# Patient Record
Sex: FEMALE | Race: WHITE | Employment: OTHER | URBAN - METROPOLITAN AREA
[De-identification: names, ages, dates, MRNs, and addresses within clinical notes are randomized per-mention and may not be internally consistent; named-entity substitution may affect disease eponyms.]

---

## 2017-07-06 ENCOUNTER — TELEPHONE (OUTPATIENT)
Dept: NUTRITION | Age: 67
End: 2017-07-06

## 2017-07-06 NOTE — TELEPHONE ENCOUNTER
Nutrition Counseling: Called pt regarding Dr. Mathew Tracey referral for nutrition counseling. Pt voiced what she would like to learn with nutrition counseling and is interested in appt. RD explained that with current referral dx, she will not be covered with Medicare (Medicare only covers is CKD or DM is listed as one of the referral diagnoses). Noted that pt can talk to referring MD to see if DM would be appropriate to add to referral dx. Pt states she would like to call Dr. Marcin Mazariegos office to see about re-sending referral with DM dx added in EMR, otherwise she won't be able to afford self-pay rate to come for appt. Gave contact info and encouraged pt to call with any other questions.      Guillermo Beltran, [address],   Outpatient Dietitian  Office: [phone]  Cell: [phone]

## 2017-08-17 ENCOUNTER — TELEPHONE (OUTPATIENT)
Dept: NUTRITION | Age: 67
End: 2017-08-17

## 2017-08-17 NOTE — TELEPHONE ENCOUNTER
Nutrition Counseling:  Pt returned call and reported she is still interested in nutrition counseling. She said she's been sick and was not able to contact MD yet regarding addition of DM diagnosis to referral. She plans to call the MD's office soon regarding referral. RD encouraged pt to call with any other questions.        Rishi Henderson, [address] [place] [address],   W: [phone]  C: [phone]

## 2017-08-17 NOTE — TELEPHONE ENCOUNTER
Nutrition Counseling: Attempted to call pt to f/u on whether she was able to contact doctor about referral (inclusion of DM dx) and if she is still interested in nutrition counseling. Pt's voice mailbox was full. RD will mail pt brochure with contact information for pt to call back if she decides to schedule.     Fercho Narayan, [address] [place] [address],   Outpatient Dietitian  Office: [phone]  Cell: [phone]

## 2017-09-06 ENCOUNTER — DOCUMENTATION ONLY (OUTPATIENT)
Dept: NUTRITION | Age: 67
End: 2017-09-06

## 2017-09-06 NOTE — PROGRESS NOTES
Nutrition Counseling: Unable to leave f/u voicemail on call attempts d/t pt's mailbox being full. Sent letter to f/u on interest in setting appt, whether she was able to contact referring MD regarding referral diagnoses, and to offer to answer any questions or schedule appt when pt is available.      Camilla Davison, [address] [place] [address],   Outpatient Dietitian  Office: [phone]  Cell: [phone]

## 2017-09-25 ENCOUNTER — DOCUMENTATION ONLY (OUTPATIENT)
Dept: NUTRITION | Age: 67
End: 2017-09-25

## 2017-09-25 NOTE — PROGRESS NOTES
Nutrition Counseling:  Pt has not contacted RD further regarding scheduling appt or with questions regarding referral. Will close referral for this office and notify referring physician.     Fercho Narayan, [address], [place]  Outpatient Dietitian  Office: [phone]  Cell: [phone]

## 2018-01-10 PROBLEM — F33.9 RECURRENT DEPRESSION (HCC): Status: ACTIVE | Noted: 2018-01-10

## 2018-01-10 PROBLEM — M79.605 PAIN OF LEFT LOWER EXTREMITY: Status: ACTIVE | Noted: 2018-01-10

## 2018-06-06 ENCOUNTER — HOSPITAL ENCOUNTER (OUTPATIENT)
Dept: GENERAL RADIOLOGY | Age: 68
Discharge: HOME | End: 2018-06-06
Payer: MEDICARE

## 2018-06-06 DIAGNOSIS — J84.9 ILD (INTERSTITIAL LUNG DISEASE) (HCC): ICD-10-CM

## 2018-06-06 DIAGNOSIS — J45.909 ASTHMA, UNSPECIFIED ASTHMA SEVERITY, UNSPECIFIED WHETHER COMPLICATED, UNSPECIFIED WHETHER PERSISTENT: ICD-10-CM

## 2018-06-06 PROCEDURE — 71046 X-RAY EXAM CHEST 2 VIEWS: CPT

## 2018-10-01 ENCOUNTER — APPOINTMENT (OUTPATIENT)
Dept: CT IMAGING | Age: 68
End: 2018-10-01
Attending: EMERGENCY MEDICINE
Payer: MEDICARE

## 2018-10-01 ENCOUNTER — HOSPITAL ENCOUNTER (EMERGENCY)
Age: 68
Discharge: HOME | End: 2018-10-01
Attending: EMERGENCY MEDICINE
Payer: MEDICARE

## 2018-10-01 VITALS
RESPIRATION RATE: 20 BRPM | OXYGEN SATURATION: 98 % | DIASTOLIC BLOOD PRESSURE: 84 MMHG | HEIGHT: 62 IN | BODY MASS INDEX: 36.99 KG/M2 | TEMPERATURE: 98 F | HEART RATE: 84 BPM | SYSTOLIC BLOOD PRESSURE: 175 MMHG | WEIGHT: 201 LBS

## 2018-10-01 DIAGNOSIS — R10.9 ACUTE FLANK PAIN: Primary | ICD-10-CM

## 2018-10-01 LAB
ALBUMIN SERPL-MCNC: 4.1 G/DL (ref 3.2–4.6)
ALBUMIN/GLOB SERPL: 1.2 {RATIO} (ref 1.2–3.5)
ALP SERPL-CCNC: 63 U/L (ref 50–136)
ALT SERPL-CCNC: 18 U/L (ref 12–65)
ANION GAP SERPL CALC-SCNC: 5 MMOL/L (ref 7–16)
AST SERPL-CCNC: 11 U/L (ref 15–37)
BASOPHILS # BLD: 0 K/UL (ref 0–0.2)
BASOPHILS NFR BLD: 1 % (ref 0–2)
BILIRUB SERPL-MCNC: 0.3 MG/DL (ref 0.2–1.1)
BUN SERPL-MCNC: 21 MG/DL (ref 8–23)
CALCIUM SERPL-MCNC: 9.6 MG/DL (ref 8.3–10.4)
CHLORIDE SERPL-SCNC: 106 MMOL/L (ref 98–107)
CO2 SERPL-SCNC: 30 MMOL/L (ref 21–32)
CREAT SERPL-MCNC: 0.94 MG/DL (ref 0.6–1)
DIFFERENTIAL METHOD BLD: ABNORMAL
EOSINOPHIL # BLD: 0.2 K/UL (ref 0–0.8)
EOSINOPHIL NFR BLD: 3 % (ref 0.5–7.8)
ERYTHROCYTE [DISTWIDTH] IN BLOOD BY AUTOMATED COUNT: 14.8 %
GLOBULIN SER CALC-MCNC: 3.3 G/DL (ref 2.3–3.5)
GLUCOSE SERPL-MCNC: 102 MG/DL (ref 65–100)
HCT VFR BLD AUTO: 40.1 % (ref 35.8–46.3)
HGB BLD-MCNC: 12.5 G/DL (ref 11.7–15.4)
IMM GRANULOCYTES # BLD: 0 K/UL (ref 0–0.5)
IMM GRANULOCYTES NFR BLD AUTO: 0 % (ref 0–5)
LIPASE SERPL-CCNC: 107 U/L (ref 73–393)
LYMPHOCYTES # BLD: 1.6 K/UL (ref 0.5–4.6)
LYMPHOCYTES NFR BLD: 24 % (ref 13–44)
MCH RBC QN AUTO: 29.1 PG (ref 26.1–32.9)
MCHC RBC AUTO-ENTMCNC: 31.2 G/DL (ref 31.4–35)
MCV RBC AUTO: 93.5 FL (ref 79.6–97.8)
MONOCYTES # BLD: 0.5 K/UL (ref 0.1–1.3)
MONOCYTES NFR BLD: 8 % (ref 4–12)
NEUTS SEG # BLD: 4.3 K/UL (ref 1.7–8.2)
NEUTS SEG NFR BLD: 64 % (ref 43–78)
NRBC # BLD: 0 K/UL (ref 0–0.2)
PLATELET # BLD AUTO: 320 K/UL (ref 150–450)
PMV BLD AUTO: 9 FL (ref 9.4–12.3)
POTASSIUM SERPL-SCNC: 4.5 MMOL/L (ref 3.5–5.1)
PROT SERPL-MCNC: 7.4 G/DL (ref 6.3–8.2)
RBC # BLD AUTO: 4.29 M/UL (ref 4.05–5.2)
SODIUM SERPL-SCNC: 141 MMOL/L (ref 136–145)
WBC # BLD AUTO: 6.8 K/UL (ref 4.3–11.1)

## 2018-10-01 PROCEDURE — 74177 CT ABD & PELVIS W/CONTRAST: CPT

## 2018-10-01 PROCEDURE — 96375 TX/PRO/DX INJ NEW DRUG ADDON: CPT | Performed by: EMERGENCY MEDICINE

## 2018-10-01 PROCEDURE — 96361 HYDRATE IV INFUSION ADD-ON: CPT | Performed by: EMERGENCY MEDICINE

## 2018-10-01 PROCEDURE — 96374 THER/PROPH/DIAG INJ IV PUSH: CPT | Performed by: EMERGENCY MEDICINE

## 2018-10-01 PROCEDURE — 99284 EMERGENCY DEPT VISIT MOD MDM: CPT | Performed by: EMERGENCY MEDICINE

## 2018-10-01 PROCEDURE — 74011636320 HC RX REV CODE- 636/320: Performed by: EMERGENCY MEDICINE

## 2018-10-01 PROCEDURE — 80053 COMPREHEN METABOLIC PANEL: CPT

## 2018-10-01 PROCEDURE — 74011000258 HC RX REV CODE- 258: Performed by: EMERGENCY MEDICINE

## 2018-10-01 PROCEDURE — 74011250636 HC RX REV CODE- 250/636: Performed by: EMERGENCY MEDICINE

## 2018-10-01 PROCEDURE — 85025 COMPLETE CBC W/AUTO DIFF WBC: CPT

## 2018-10-01 PROCEDURE — 83690 ASSAY OF LIPASE: CPT

## 2018-10-01 PROCEDURE — 81003 URINALYSIS AUTO W/O SCOPE: CPT | Performed by: EMERGENCY MEDICINE

## 2018-10-01 RX ORDER — ONDANSETRON 4 MG/1
4 TABLET, FILM COATED ORAL
Qty: 15 TAB | Refills: 0 | Status: SHIPPED | OUTPATIENT
Start: 2018-10-01 | End: 2018-12-17 | Stop reason: CLARIF

## 2018-10-01 RX ORDER — SODIUM CHLORIDE 0.9 % (FLUSH) 0.9 %
10 SYRINGE (ML) INJECTION
Status: COMPLETED | OUTPATIENT
Start: 2018-10-01 | End: 2018-10-01

## 2018-10-01 RX ORDER — TRAMADOL HYDROCHLORIDE 50 MG/1
100 TABLET ORAL
Qty: 19 TAB | Refills: 0 | Status: SHIPPED | OUTPATIENT
Start: 2018-10-01 | End: 2022-02-22 | Stop reason: ALTCHOICE

## 2018-10-01 RX ORDER — METHOCARBAMOL 750 MG/1
1500 TABLET, FILM COATED ORAL 4 TIMES DAILY
Qty: 40 TAB | Refills: 0 | Status: SHIPPED | OUTPATIENT
Start: 2018-10-01 | End: 2022-02-22 | Stop reason: ALTCHOICE

## 2018-10-01 RX ORDER — HYDROMORPHONE HYDROCHLORIDE 1 MG/ML
1 INJECTION, SOLUTION INTRAMUSCULAR; INTRAVENOUS; SUBCUTANEOUS
Status: COMPLETED | OUTPATIENT
Start: 2018-10-01 | End: 2018-10-01

## 2018-10-01 RX ORDER — ONDANSETRON 2 MG/ML
4 INJECTION INTRAMUSCULAR; INTRAVENOUS
Status: COMPLETED | OUTPATIENT
Start: 2018-10-01 | End: 2018-10-01

## 2018-10-01 RX ADMIN — SODIUM CHLORIDE 100 ML: 900 INJECTION, SOLUTION INTRAVENOUS at 14:30

## 2018-10-01 RX ADMIN — ONDANSETRON 4 MG: 2 INJECTION INTRAMUSCULAR; INTRAVENOUS at 14:01

## 2018-10-01 RX ADMIN — HYDROMORPHONE HYDROCHLORIDE 1 MG: 1 INJECTION, SOLUTION INTRAMUSCULAR; INTRAVENOUS; SUBCUTANEOUS at 14:01

## 2018-10-01 RX ADMIN — SODIUM CHLORIDE 1000 ML: 900 INJECTION, SOLUTION INTRAVENOUS at 13:50

## 2018-10-01 RX ADMIN — Medication 10 ML: at 14:30

## 2018-10-01 RX ADMIN — IOPAMIDOL 100 ML: 755 INJECTION, SOLUTION INTRAVENOUS at 14:29

## 2018-10-01 NOTE — ED PROVIDER NOTES
Patient is a 70 y.o. female presenting with flank pain. The history is provided by the patient and a relative. Flank Pain This is a recurrent problem. The current episode started more than 1 week ago. The problem has been gradually worsening. The problem occurs daily. The pain is associated with no known injury. The pain is present in the right side. The quality of the pain is described as aching and stabbing. The pain is moderate. The symptoms are aggravated by bending and twisting. Associated symptoms include abdominal pain. Pertinent negatives include no chest pain, no fever, no weight loss, no headaches, no abdominal swelling, no dysuria, no leg pain, no paresis, no tingling and no weakness. She has tried nothing for the symptoms. Risk factors include obesity. The patient's surgical history non-contributory Past Medical History:  
Diagnosis Date  History of pneumonia 04/2013 Pneumonia/asthma exacerbation, was on vent and transferred to [facility] for weaning; D/C home on oxygen. Past Surgical History:  
Procedure Laterality Date  HX HEART CATHETERIZATION    
 HX HEENT Sinus sx Family History:  
Problem Relation Age of Onset  Heart Disease Mother  Liver Disease Father   
  alcohol related  Diabetes Sister Social History Social History  Marital status:  Spouse name: N/A  
 Number of children: N/A  
 Years of education: N/A Occupational History  previously worked as  for school system Social History Main Topics  Smoking status: Never Smoker  Smokeless tobacco: Never Used  Alcohol use No  
 Drug use: No  
 Sexual activity: Not on file Other Topics Concern  Not on file Social History Narrative , lives with . She works as a  for the [employer]. She is a lifelong nonsmoker/drinker. ALLERGIES: Latex, natural rubber; Cephalosporins; Diprivan [propofol]; Lidocaine; Lyrica [pregabalin]; Morphine; Pcn [penicillins]; and Rocephin [ceftriaxone] Review of Systems Constitutional: Negative for chills, fever and weight loss. HENT: Negative for congestion, ear pain and rhinorrhea. Eyes: Negative for photophobia and discharge. Respiratory: Negative for cough and shortness of breath. Cardiovascular: Negative for chest pain and palpitations. Gastrointestinal: Positive for abdominal pain. Negative for constipation, diarrhea and vomiting. Endocrine: Negative for cold intolerance and heat intolerance. Genitourinary: Positive for flank pain. Negative for dysuria. Musculoskeletal: Negative for arthralgias, myalgias and neck pain. Skin: Negative for rash and wound. Allergic/Immunologic: Negative for environmental allergies and food allergies. Neurological: Negative for tingling, syncope, weakness and headaches. Hematological: Negative for adenopathy. Does not bruise/bleed easily. Psychiatric/Behavioral: Negative for dysphoric mood. The patient is not nervous/anxious. All other systems reviewed and are negative. Vitals:  
 10/01/18 1242 BP: 182/77 Resp: 24 Temp: 98 °F (36.7 °C) SpO2: 95% Weight: 91.2 kg (201 lb) Height: 5' 2\" (1.575 m) Physical Exam  
Constitutional: She is oriented to person, place, and time. She appears well-developed and well-nourished. She appears distressed. HENT:  
Head: Normocephalic and atraumatic. Mouth/Throat: Oropharynx is clear and moist.  
Eyes: Conjunctivae and EOM are normal. Pupils are equal, round, and reactive to light. Right eye exhibits no discharge. Left eye exhibits no discharge. No scleral icterus. Neck: Normal range of motion. Neck supple. No thyromegaly present. Cardiovascular: Normal rate, regular rhythm, normal heart sounds and intact distal pulses. Exam reveals no gallop and no friction rub. No murmur heard. Pulmonary/Chest: Effort normal and breath sounds normal. No respiratory distress. She has no wheezes. She exhibits no tenderness. Abdominal: Soft. Bowel sounds are normal. She exhibits no distension. There is no hepatosplenomegaly. There is generalized tenderness. There is no rebound and no guarding. No hernia. exam reveals scattered areas of tenderness across the entire 4 quadrants of the patient's abdomen Unfortunately, there is a lack of consistency with regards to location and severity of the symptoms with my palpation Musculoskeletal: Normal range of motion. She exhibits no edema or tenderness. Neurological: She is alert and oriented to person, place, and time. No cranial nerve deficit. She exhibits normal muscle tone. Skin: Skin is warm. No rash noted. She is not diaphoretic. No erythema. Psychiatric: Her speech is normal and behavior is normal. Judgment and thought content normal. Her mood appears anxious. Cognition and memory are normal.  
  
 
MDM Number of Diagnoses or Management Options Acute flank pain: new and requires workup Diagnosis management comments: Differential diagnosis Colitis, kidney stone, UTI, bowel obstruction Patient's controlled substance prescription records reviewed @ the [facility]  Aware website. Information will be utilized for accurate and appropriate patient care. Amount and/or Complexity of Data Reviewed Clinical lab tests: ordered and reviewed Tests in the radiology section of CPT®: ordered Tests in the medicine section of CPT®: ordered and reviewed Review and summarize past medical records: yes Risk of Complications, Morbidity, and/or Mortality Presenting problems: moderate Diagnostic procedures: moderate Management options: moderate General comments: Elements of this note have been dictated via voice recognition software. Text and phrases may be limited by the accuracy of the software. The chart has been reviewed, but errors may still be present. Patient Progress Patient progress: improved ED Course Procedures

## 2018-10-01 NOTE — ED NOTES
I have reviewed discharge instructions with the patient and family. The patient and family  verbalized understanding. Patient left ED via Discharge Method: ambulatory to Home with (insert name of family/friend, self). Opportunity for questions and clarification provided. Patient given 3 scripts. To continue your aftercare when you leave the hospital, you may receive an automated call from our care team to check in on how you are doing. This is a free service and part of our promise to provide the best care and service to meet your aftercare needs.  If you have questions, or wish to unsubscribe from this service please call [phone]. Thank you for Choosing our [facility].   
3

## 2018-10-01 NOTE — DISCHARGE INSTRUCTIONS

## 2018-11-19 ENCOUNTER — HOSPITAL ENCOUNTER (OUTPATIENT)
Dept: SURGERY | Age: 68
Discharge: HOME | End: 2018-11-19

## 2018-12-04 ENCOUNTER — APPOINTMENT (OUTPATIENT)
Dept: GENERAL RADIOLOGY | Age: 68
End: 2018-12-04
Attending: ANESTHESIOLOGY
Payer: MEDICARE

## 2018-12-04 ENCOUNTER — HOSPITAL ENCOUNTER (OUTPATIENT)
Age: 68
Setting detail: OUTPATIENT SURGERY
Discharge: HOME | End: 2018-12-04
Attending: ANESTHESIOLOGY | Admitting: ANESTHESIOLOGY
Payer: MEDICARE

## 2018-12-04 VITALS
TEMPERATURE: 97.9 F | WEIGHT: 206 LBS | SYSTOLIC BLOOD PRESSURE: 156 MMHG | BODY MASS INDEX: 37.68 KG/M2 | RESPIRATION RATE: 16 BRPM | HEART RATE: 61 BPM | OXYGEN SATURATION: 95 % | DIASTOLIC BLOOD PRESSURE: 68 MMHG

## 2018-12-04 PROCEDURE — 76210000026 HC REC RM PH II 1 TO 1.5 HR: Performed by: ANESTHESIOLOGY

## 2018-12-04 PROCEDURE — 74011250636 HC RX REV CODE- 250/636

## 2018-12-04 PROCEDURE — 74011636320 HC RX REV CODE- 636/320: Performed by: ANESTHESIOLOGY

## 2018-12-04 PROCEDURE — 74011000250 HC RX REV CODE- 250: Performed by: ANESTHESIOLOGY

## 2018-12-04 PROCEDURE — 77030003545 HC NDL EPDRL EPIM -B: Performed by: ANESTHESIOLOGY

## 2018-12-04 PROCEDURE — 77030014125 HC TY EPDRL BBMI -B: Performed by: ANESTHESIOLOGY

## 2018-12-04 PROCEDURE — 76010000093 HC SPECIAL PROCEDURE: Performed by: ANESTHESIOLOGY

## 2018-12-04 PROCEDURE — 99152 MOD SED SAME PHYS/QHP 5/>YRS: CPT

## 2018-12-04 PROCEDURE — A4300 CATH IMPL VASC ACCESS PORTAL: HCPCS | Performed by: ANESTHESIOLOGY

## 2018-12-04 PROCEDURE — 74011250636 HC RX REV CODE- 250/636: Performed by: ANESTHESIOLOGY

## 2018-12-04 RX ORDER — TRIAMCINOLONE ACETONIDE 40 MG/ML
INJECTION, SUSPENSION INTRA-ARTICULAR; INTRAMUSCULAR AS NEEDED
Status: DISCONTINUED | OUTPATIENT
Start: 2018-12-04 | End: 2018-12-04 | Stop reason: HOSPADM

## 2018-12-04 RX ORDER — FENTANYL CITRATE 50 UG/ML
INJECTION, SOLUTION INTRAMUSCULAR; INTRAVENOUS AS NEEDED
Status: DISCONTINUED | OUTPATIENT
Start: 2018-12-04 | End: 2018-12-04 | Stop reason: HOSPADM

## 2018-12-04 RX ORDER — MIDAZOLAM HYDROCHLORIDE 1 MG/ML
INJECTION, SOLUTION INTRAMUSCULAR; INTRAVENOUS AS NEEDED
Status: DISCONTINUED | OUTPATIENT
Start: 2018-12-04 | End: 2018-12-04 | Stop reason: HOSPADM

## 2018-12-04 RX ORDER — METHYLPREDNISOLONE ACETATE 40 MG/ML
INJECTION, SUSPENSION INTRA-ARTICULAR; INTRALESIONAL; INTRAMUSCULAR; SOFT TISSUE AS NEEDED
Status: DISCONTINUED | OUTPATIENT
Start: 2018-12-04 | End: 2018-12-04 | Stop reason: HOSPADM

## 2018-12-04 RX ORDER — SODIUM CHLORIDE 9 MG/ML
INJECTION INTRAMUSCULAR; INTRAVENOUS; SUBCUTANEOUS AS NEEDED
Status: DISCONTINUED | OUTPATIENT
Start: 2018-12-04 | End: 2018-12-04 | Stop reason: HOSPADM

## 2018-12-04 NOTE — OP NOTES
[facility] REPORT    Sean Diaz  MR#: [id number]  : 1950  ACCOUNT #: [de-identified]   DATE OF SERVICE: 2018    PREOPERATIVE DIAGNOSES:  Extensive lumbar spondylosis and neural foraminal stenosis with chronic pain in the lower back and both legs. POSTOPERATIVE DIAGNOSES:  Extensive lumbar spondylosis and neural foraminal stenosis with chronic pain in the lower back and both legs. PROCEDURES PERFORMED:  1. Caudal epidural steroid injection. 2.  Epidurogram.  3.  Fluoroscopy. 4.  IV sedation. SURGEON:  Mendoza Roe MD    ASSISTANT:  None. ANESTHESIA:  Local with IV sedation. INDICATIONS:  The patient is a 59-year-old female with pain in her back and legs. She has seen my partner, Dr. Oumar Pedraza and she presents today for her first epidural steroid injection at [place]. DESCRIPTION OF PROCEDURE:  After informed consent was obtained, a 22-gauge IV was placed in the right arm. The patient was taken to the operating room and initially positioned sitting upright. Lower back was prepped and draped in the usual sterile fashion. A small skin wheal was made approximately over the L4-L5 interspace using normal saline. Next, an 18-gauge Tuohy needle was advanced using the loss of resistance technique. Multiple attempts were made. Bone was contacted. The procedure was repeated one level higher and lower. The bone was contacted on each attempt. The technique was then abandoned and the patient was positioned prone. The lower back was reprepped and redraped in the usual sterile fashion.   A small skin wheal was made over the sacral hiatus using normal saline and an 18-gauge Tuohy needle was advanced through the sacrococcygeal ligament of the caudal canal.  Views were confirmed in AP and lateral fluoroscopy, 3 mL of Omnipaque revealed spread of contrast upwards to the L4 and L5 levels and down both S1 nerve roots.  This was followed by 12 mL of solution consisting of 80 mg of triamcinolone and Depo-Medrol and 10 mL of preservative-free normal saline. She tolerated the entire procedure well. There were no complications. All fluoroscopic views were interpreted by me. ESTIMATED BLOOD LOSS:  None. FLUIDS:  None. COMPLICATIONS:  None. SPECIMENS REMOVED:  none    IMPLANTS:  none    CONDITION:  Stable. PLANS:    1.  I recommended continuing her current medications. I suggested a lumbar DDS decompression brace for her lower back due to her scoliosis. I feel the brace will help improve her back pain by supporting weakened lumbar paraspinous muscles and addressing the scoliotic deformity to her lumbar spine. 2.  I will see her back in 2 weeks for a repeat caudal epidural steroid injection at [facility].         MD MANISH Gleason / [place]  D: 12/04/2018 07:49     T: 12/04/2018 10:45  JOB #: [id number]

## 2018-12-04 NOTE — DISCHARGE INSTRUCTIONS
Pain Management Aftercare    Common Side Effects that may last 8-12 hours:  Drowsiness  Slurred speech  Dizziness  Poor balance  Blurred vision     Hangover effect (headache, upset stomach, etc.)    Aftercare Instructions: You must have a responsible adult drive you home. Do not drive a car or operate equipment for at least 12 hours. Do not take any new medications for at least 24 hours unless your doctor has prescribed them and he is aware that you are taking them. Do not drink any alcoholic beverages until the next day. Advance to your normal diet as tolerated. Expect soreness at the injection site that will improve over the next 24 hours. Avoid strenuous exercise or heavy lifting. Pre-injection activities may be resumed tomorrow (unless instructed otherwise by your doctor). Notify your doctor immediately if any of the following symptoms occur:  Severe pain at the injection site  Bleeding or drainage from injection site  Fever 101 degrees F or greater  New or increased weakness/numbness of extremities that does not resolve  Severe headache that disappears or gets better when you lie down  Breathing difficulty  Skin rash  Vomiting  Seizures  Unusual drowsiness    Doctor's Phone Number: [phone]    Follow-up Care: 12/18/18- For second injection, pre-assessment will call you with details for time of arrival.       Lesle Skiff from Nurse    PATIENT INSTRUCTIONS:    After general anesthesia or intravenous sedation, for 24 hours or while taking prescription Narcotics:  · Limit your activities  · Do not drive and operate hazardous machinery  · Do not make important personal or business decisions  · Do  not drink alcoholic beverages  · If you have not urinated within 8 hours after discharge, please contact your surgeon on call. *  Please give a list of your current medications to your Primary Care Provider.     *  Please update this list whenever your medications are discontinued, doses are changed, or new medications (including over-the-counter products) are added. *  Please carry medication information at all times in case of emergency situations. These are general instructions for a healthy lifestyle:    No smoking/ No tobacco products/ Avoid exposure to second hand smoke    Surgeon General's Warning:  Quitting smoking now greatly reduces serious risk to your health. Obesity, smoking, and sedentary lifestyle greatly increases your risk for illness    A healthy diet, regular physical exercise & weight monitoring are important for maintaining a healthy lifestyle    You may be retaining fluid if you have a history of heart failure or if you experience any of the following symptoms:  Weight gain of 3 pounds or more overnight or 5 pounds in a week, increased swelling in our hands or feet or shortness of breath while lying flat in bed. Please call your doctor as soon as you notice any of these symptoms; do not wait until your next office visit. Recognize signs and symptoms of STROKE:    F-face looks uneven    A-arms unable to move or move unevenly    S-speech slurred or non-existent    T-time-call 911 as soon as signs and symptoms begin-DO NOT go       Back to bed or wait to see if you get better-TIME IS BRAIN. ACTIVITY  · As tolerated and as directed by your doctor. · Bathe or shower as directed by your doctor. DIET  · Clear liquids until no nausea or vomiting; then light diet for the first day. · Advance to regular diet on second day, unless your doctor orders otherwise. · If nausea and vomiting continues, call your doctor. PAIN  · Take pain medication as directed by your doctor. · Call your doctor if pain is NOT relieved by medication. · DO NOT take aspirin of blood thinners unless directed by your doctor.        CALL YOUR DOCTOR IF   · Excessive bleeding that does not stop after holding pressure over the area  · Temperature of 101 degrees F or above  · Excessive redness, swelling or bruising, and/ or green or yellow, smelly discharge from incision    AFTER ANESTHESIA   · For the first 24 hours: DO NOT Drive, Drink alcoholic beverages, or Make important decisions. · Be aware of dizziness following anesthesia and while taking pain medication. DISCHARGE SUMMARY from Nurse    PATIENT INSTRUCTIONS:    After general anesthesia or intravenous sedation, for 24 hours or while taking prescription Narcotics:  · Limit your activities  · Do not drive and operate hazardous machinery  · Do not make important personal or business decisions  · Do  not drink alcoholic beverages  · If you have not urinated within 8 hours after discharge, please contact your surgeon on call. *  Please give a list of your current medications to your Primary Care Provider. *  Please update this list whenever your medications are discontinued, doses are      changed, or new medications (including over-the-counter products) are added. *  Please carry medication information at all times in case of emergency situations. These are general instructions for a healthy lifestyle:    No smoking/ No tobacco products/ Avoid exposure to second hand smoke    Surgeon General's Warning:  Quitting smoking now greatly reduces serious risk to your health. Obesity, smoking, and sedentary lifestyle greatly increases your risk for illness    A healthy diet, regular physical exercise & weight monitoring are important for maintaining a healthy lifestyle    You may be retaining fluid if you have a history of heart failure or if you experience any of the following symptoms:  Weight gain of 3 pounds or more overnight or 5 pounds in a week, increased swelling in our hands or feet or shortness of breath while lying flat in bed. Please call your doctor as soon as you notice any of these symptoms; do not wait until your next office visit.     Recognize signs and symptoms of STROKE:    F-face looks uneven    A-arms unable to move or move unevenly    S-speech slurred or non-existent    T-time-call 911 as soon as signs and symptoms begin-DO NOT go       Back to bed or wait to see if you get better-TIME IS BRAIN.

## 2018-12-04 NOTE — PERIOP NOTES
OR scheduling called. Pt placed on schedule for 12/18/18 for #2 Lumbar DARWIN per request Dr Tasha Alba. DC instructions reflect this schedule as well.

## 2018-12-04 NOTE — BRIEF OP NOTE
BRIEF OPERATIVE NOTE Date of Procedure: 12/4/2018 Preoperative Diagnosis: Lumbar radiculopathy [M54.16] Lumbosacral stenosis [M48.07] Postoperative Diagnosis: Lumbosacral stenosis [M48.07] Lumbar radiculopathy [M54.16] Procedure(s): 
L5-S1 LUMBAR EPIDURAL STEROID INJECTION Surgeon(s) and Role: 
   * Magdalena Alcocer MD - Primary Surgical Assistant: none Surgical Staff: 
Circ-1: Kati Portillo RN Scrub Tech-1: Owen Joel Scrub Tech-2: Jonna Rojo Event Time In Time Out Incision Start 2131 Incision Close 0429 Anesthesia: Con-Sed  
Estimated Blood Loss: none Specimens: * No specimens in log * Findings: none Complications: none Implants: * No implants in log *

## 2018-12-18 ENCOUNTER — APPOINTMENT (OUTPATIENT)
Dept: GENERAL RADIOLOGY | Age: 68
End: 2018-12-18
Attending: ANESTHESIOLOGY
Payer: MEDICARE

## 2018-12-18 ENCOUNTER — HOSPITAL ENCOUNTER (OUTPATIENT)
Age: 68
Setting detail: OUTPATIENT SURGERY
Discharge: HOME | End: 2018-12-18
Attending: ANESTHESIOLOGY | Admitting: ANESTHESIOLOGY
Payer: MEDICARE

## 2018-12-18 VITALS
DIASTOLIC BLOOD PRESSURE: 73 MMHG | TEMPERATURE: 98.6 F | WEIGHT: 201 LBS | OXYGEN SATURATION: 95 % | SYSTOLIC BLOOD PRESSURE: 157 MMHG | BODY MASS INDEX: 36.76 KG/M2 | HEART RATE: 76 BPM | RESPIRATION RATE: 16 BRPM

## 2018-12-18 PROCEDURE — 74011636320 HC RX REV CODE- 636/320: Performed by: ANESTHESIOLOGY

## 2018-12-18 PROCEDURE — 76010000230: Performed by: ANESTHESIOLOGY

## 2018-12-18 PROCEDURE — 77030014125 HC TY EPDRL BBMI -B: Performed by: ANESTHESIOLOGY

## 2018-12-18 PROCEDURE — 74011250636 HC RX REV CODE- 250/636: Performed by: ANESTHESIOLOGY

## 2018-12-18 PROCEDURE — 74011000250 HC RX REV CODE- 250: Performed by: ANESTHESIOLOGY

## 2018-12-18 PROCEDURE — 76210000020 HC REC RM PH II FIRST 0.5 HR: Performed by: ANESTHESIOLOGY

## 2018-12-18 PROCEDURE — A4300 CATH IMPL VASC ACCESS PORTAL: HCPCS | Performed by: ANESTHESIOLOGY

## 2018-12-18 PROCEDURE — 77030003545 HC NDL EPDRL EPIM -B: Performed by: ANESTHESIOLOGY

## 2018-12-18 PROCEDURE — 74011250636 HC RX REV CODE- 250/636

## 2018-12-18 RX ORDER — MIDAZOLAM HYDROCHLORIDE 1 MG/ML
INJECTION, SOLUTION INTRAMUSCULAR; INTRAVENOUS AS NEEDED
Status: DISCONTINUED | OUTPATIENT
Start: 2018-12-18 | End: 2018-12-18 | Stop reason: HOSPADM

## 2018-12-18 RX ORDER — METHYLPREDNISOLONE ACETATE 40 MG/ML
INJECTION, SUSPENSION INTRA-ARTICULAR; INTRALESIONAL; INTRAMUSCULAR; SOFT TISSUE AS NEEDED
Status: DISCONTINUED | OUTPATIENT
Start: 2018-12-18 | End: 2018-12-18 | Stop reason: HOSPADM

## 2018-12-18 RX ORDER — SODIUM CHLORIDE 9 MG/ML
INJECTION INTRAMUSCULAR; INTRAVENOUS; SUBCUTANEOUS AS NEEDED
Status: DISCONTINUED | OUTPATIENT
Start: 2018-12-18 | End: 2018-12-18 | Stop reason: HOSPADM

## 2018-12-18 RX ORDER — BUPIVACAINE HYDROCHLORIDE AND EPINEPHRINE 2.5; 5 MG/ML; UG/ML
INJECTION, SOLUTION EPIDURAL; INFILTRATION; INTRACAUDAL; PERINEURAL AS NEEDED
Status: DISCONTINUED | OUTPATIENT
Start: 2018-12-18 | End: 2018-12-18 | Stop reason: HOSPADM

## 2018-12-18 RX ORDER — FENTANYL CITRATE 50 UG/ML
INJECTION, SOLUTION INTRAMUSCULAR; INTRAVENOUS AS NEEDED
Status: DISCONTINUED | OUTPATIENT
Start: 2018-12-18 | End: 2018-12-18 | Stop reason: HOSPADM

## 2018-12-18 RX ORDER — TRIAMCINOLONE ACETONIDE 40 MG/ML
INJECTION, SUSPENSION INTRA-ARTICULAR; INTRAMUSCULAR AS NEEDED
Status: DISCONTINUED | OUTPATIENT
Start: 2018-12-18 | End: 2018-12-18 | Stop reason: HOSPADM

## 2018-12-18 NOTE — OP NOTES
[facility] REPORT    Mathew Oviedo  MR#: [id number]  : 1950  ACCOUNT #: [de-identified]   DATE OF SERVICE: 2018    PREOPERATIVE DIAGNOSES:  Extensive lumbar spondylosis and neural foraminal stenosis with chronic pain in the lower back and both legs. POSTOPERATIVE DIAGNOSES:  Extensive lumbar spondylosis and neural foraminal stenosis with chronic pain in the lower back and both legs. PROCEDURES PERFORMED:  1. Caudal epidural steroid injection. 2.  Epidurogram.  3.  Fluoroscopy. 4.  Intravenous sedation. SURGEON:  Tosha Davila MD    ASSISTANT:  none     ANESTHESIA:  Local with IV sedation. INDICATIONS:  The patient is a 59-year-old female with pain in the back and legs. She returns today for a second caudal epidural steroid injection at [facility]. She is doing better since her first injection 2 weeks ago. DESCRIPTION OF PROCEDURE:  After informed consent was obtained, the patient was taken to the fluoroscopy suite and positioned prone. A 22-gauge IV was placed in the right arm. The lower back and buttock were prepped and draped in the usual sterile fashion. The skin was infiltrated over the sacral hiatus using normal saline. Next, an 18-gauge Tuohy needle was advanced through the sacrococcygeal ligament into the caudal canal, and views were confirmed in AP and lateral fluoroscopy. Three mL of Omnipaque revealed spread of contrast all the way up to the L4 level and down the L5 and S1 nerve roots. This was followed by 12 mL of solution consisting of 80 mg of triamcinolone and Depo-Medrol and 10 mL of preservative-free normal saline. All fluoroscopic views were interpreted by me. She tolerated the entire procedure well. There were no complications. She was transferred to the recovery room in satisfactory condition. ESTIMATED BLOOD LOSS:  None.     SPECIMENS REMOVED:  none     FLUIDS: None.    COMPLICATIONS:  None. IMPLANTS:  none     CONDITION:  Stable. PLAN:  I recommended continuing her current medications. I will see her back in 3 weeks for a possible third caudal epidural steroid injection at [place] [address].       MD MANISH Calvillo / Joe Miller  D: 12/18/2018 07:50     T: 12/18/2018 10:20  JOB #: [id number]

## 2018-12-18 NOTE — PROGRESS NOTES
Spiritual Care visit. Initial Visit, Pre Surgery Consult. Visit and prayer before patient goes to surgery.     Visit by Stacey Doty M.Ed., Th.B. ,Staff

## 2018-12-18 NOTE — DISCHARGE INSTRUCTIONS
Pain Management Aftercare    Common Side Effects that may last 8-12 hours:  Drowsiness  Slurred speech  Dizziness  Poor balance  Blurred vision     Hangover effect (headache, upset stomach, etc.)    Aftercare Instructions: You must have a responsible adult drive you home. Do not drive a car or operate equipment for at least 12 hours. Do not take any new medications for at least 24 hours unless your doctor has prescribed them and he is aware that you are taking them. Do not drink any alcoholic beverages until the next day. Advance to your normal diet as tolerated. Expect soreness at the injection site that will improve over the next 24 hours. Avoid strenuous exercise or heavy lifting. Pre-injection activities may be resumed tomorrow (unless instructed otherwise by your doctor). Notify your doctor immediately if any of the following symptoms occur:  Severe pain at the injection site  Bleeding or drainage from injection site  Fever 101 degrees F or greater  New or increased weakness/numbness of extremities that does not resolve  Severe headache that disappears or gets better when you lie down  Breathing difficulty  Skin rash  Vomiting  Seizures  Unusual drowsiness        ACTIVITY  · As tolerated and as directed by your doctor. · Bathe or shower as directed by your doctor. DIET  · Clear liquids until no nausea or vomiting; then light diet for the first day. · Advance to regular diet on second day, unless your doctor orders otherwise. · If nausea and vomiting continues, call your doctor. PAIN  · Take pain medication as directed by your doctor. · Call your doctor if pain is NOT relieved by medication. · DO NOT take aspirin of blood thinners unless directed by your doctor.      DRESSING CARE you may remove band-aids as soon as you would like      CALL YOUR DOCTOR IF   · Excessive bleeding that does not stop after holding pressure over the area  · Temperature of 101 degrees F or above  · Excessive redness, swelling or bruising, and/ or green or yellow, smelly discharge from incision    AFTER ANESTHESIA   · For the first 24 hours: DO NOT Drive, Drink alcoholic beverages, or Make important decisions. · Be aware of dizziness following anesthesia and while taking pain medication. DISCHARGE SUMMARY from Nurse    PATIENT INSTRUCTIONS:    After general anesthesia or intravenous sedation, for 24 hours or while taking prescription Narcotics:  · Limit your activities  · Do not drive and operate hazardous machinery  · Do not make important personal or business decisions  · Do  not drink alcoholic beverages  · If you have not urinated within 8 hours after discharge, please contact your surgeon on call. *  Please give a list of your current medications to your Primary Care Provider. *  Please update this list whenever your medications are discontinued, doses are      changed, or new medications (including over-the-counter products) are added. *  Please carry medication information at all times in case of emergency situations. These are general instructions for a healthy lifestyle:    No smoking/ No tobacco products/ Avoid exposure to second hand smoke    Surgeon General's Warning:  Quitting smoking now greatly reduces serious risk to your health. Obesity, smoking, and sedentary lifestyle greatly increases your risk for illness    A healthy diet, regular physical exercise & weight monitoring are important for maintaining a healthy lifestyle    You may be retaining fluid if you have a history of heart failure or if you experience any of the following symptoms:  Weight gain of 3 pounds or more overnight or 5 pounds in a week, increased swelling in our hands or feet or shortness of breath while lying flat in bed. Please call your doctor as soon as you notice any of these symptoms; do not wait until your next office visit.     Recognize signs and symptoms of STROKE:    F-face looks uneven    A-arms unable to move or move unevenly    S-speech slurred or non-existent    T-time-call 911 as soon as signs and symptoms begin-DO NOT go       Back to bed or wait to see if you get better-TIME IS BRAIN.

## 2018-12-18 NOTE — BRIEF OP NOTE
BRIEF OPERATIVE NOTE    Date of Procedure: 12/18/2018   Preoperative Diagnosis: Lumbar radiculopathy [M54.16]  Lumbosacral stenosis [M48.07]  Postoperative Diagnosis: Lumbar radiculopathy   Lumbosacral stenosis    Procedure(s):  L5-S1 LUMBAR EPIDURAL STEROID INJECTION#2  Surgeon(s) and Role:     * Angelica Menendez MD - Primary         Surgical Assistant: none    Surgical Staff:  Smooth Guzmán: Sharon Sosa RN  Local Nurse Monitor: Lissa Costello RN  Radiology Technician: Lexx Jensen RT, R, CT  Scrub Tech-1: Taylor Rendon  Scrub Tech-2: Anne Hidalgo  Event Time In Time Out   Incision Start 0740    Incision Close 9994      Anesthesia: Con-Sed   Estimated Blood Loss: non  Specimens: * No specimens in log *   Findings: none   Complications: none  Implants: * No implants in log *

## 2019-01-15 ENCOUNTER — HOSPITAL ENCOUNTER (OUTPATIENT)
Dept: LAB | Age: 69
Discharge: HOME | End: 2019-01-15
Payer: MEDICARE

## 2019-01-15 DIAGNOSIS — J84.9 ILD (INTERSTITIAL LUNG DISEASE) (HCC): ICD-10-CM

## 2019-01-15 LAB
ALBUMIN SERPL-MCNC: 3.7 G/DL (ref 3.2–4.6)
ALBUMIN/GLOB SERPL: 1.1 {RATIO} (ref 1.2–3.5)
ALP SERPL-CCNC: 53 U/L (ref 50–136)
ALT SERPL-CCNC: 21 U/L (ref 12–65)
AST SERPL-CCNC: 10 U/L (ref 15–37)
BILIRUB DIRECT SERPL-MCNC: <0.1 MG/DL
BILIRUB SERPL-MCNC: 0.3 MG/DL (ref 0.2–1.1)
GLOBULIN SER CALC-MCNC: 3.3 G/DL (ref 2.3–3.5)
PROT SERPL-MCNC: 7 G/DL (ref 6.3–8.2)

## 2019-01-15 PROCEDURE — 36415 COLL VENOUS BLD VENIPUNCTURE: CPT

## 2019-01-15 PROCEDURE — 80076 HEPATIC FUNCTION PANEL: CPT

## 2019-03-01 ENCOUNTER — HOSPITAL ENCOUNTER (OUTPATIENT)
Dept: LAB | Age: 69
Discharge: HOME | End: 2019-03-01
Payer: MEDICARE

## 2019-03-01 DIAGNOSIS — J84.9 ILD (INTERSTITIAL LUNG DISEASE) (HCC): ICD-10-CM

## 2019-03-01 LAB
ALBUMIN SERPL-MCNC: 3.8 G/DL (ref 3.2–4.6)
ALBUMIN/GLOB SERPL: 1.1 {RATIO} (ref 1.2–3.5)
ALP SERPL-CCNC: 61 U/L (ref 50–136)
ALT SERPL-CCNC: 26 U/L (ref 12–65)
AST SERPL-CCNC: 15 U/L (ref 15–37)
BILIRUB DIRECT SERPL-MCNC: <0.1 MG/DL
BILIRUB SERPL-MCNC: 0.4 MG/DL (ref 0.2–1.1)
GLOBULIN SER CALC-MCNC: 3.5 G/DL (ref 2.3–3.5)
PROT SERPL-MCNC: 7.3 G/DL (ref 6.3–8.2)

## 2019-03-01 PROCEDURE — 80076 HEPATIC FUNCTION PANEL: CPT

## 2019-03-01 PROCEDURE — 36415 COLL VENOUS BLD VENIPUNCTURE: CPT

## 2019-03-29 ENCOUNTER — HOSPITAL ENCOUNTER (OUTPATIENT)
Dept: LAB | Age: 69
Discharge: HOME | End: 2019-03-29
Payer: MEDICARE

## 2019-03-29 DIAGNOSIS — Z91.89 AT RISK FOR HEPATOTOXICITY: ICD-10-CM

## 2019-03-29 LAB
ALBUMIN SERPL-MCNC: 3.6 G/DL (ref 3.2–4.6)
ALBUMIN/GLOB SERPL: 1.1 {RATIO} (ref 1.2–3.5)
ALP SERPL-CCNC: 58 U/L (ref 50–136)
ALT SERPL-CCNC: 23 U/L (ref 12–65)
AST SERPL-CCNC: 13 U/L (ref 15–37)
BILIRUB DIRECT SERPL-MCNC: <0.1 MG/DL
BILIRUB SERPL-MCNC: 0.4 MG/DL (ref 0.2–1.1)
GLOBULIN SER CALC-MCNC: 3.3 G/DL (ref 2.3–3.5)
PROT SERPL-MCNC: 6.9 G/DL (ref 6.3–8.2)

## 2019-03-29 PROCEDURE — 36415 COLL VENOUS BLD VENIPUNCTURE: CPT

## 2019-03-29 PROCEDURE — 80076 HEPATIC FUNCTION PANEL: CPT

## 2019-05-03 ENCOUNTER — HOSPITAL ENCOUNTER (OUTPATIENT)
Dept: CT IMAGING | Age: 69
Discharge: HOME | End: 2019-05-03
Attending: INTERNAL MEDICINE
Payer: MEDICARE

## 2019-05-03 DIAGNOSIS — J84.9 ILD (INTERSTITIAL LUNG DISEASE) (HCC): ICD-10-CM

## 2019-05-03 PROCEDURE — 71250 CT THORAX DX C-: CPT

## 2019-05-24 ENCOUNTER — HOSPITAL ENCOUNTER (OUTPATIENT)
Dept: CARDIAC REHAB | Age: 69
Discharge: HOME | End: 2019-05-24
Attending: INTERNAL MEDICINE

## 2019-05-24 NOTE — CARDIO/PULMONARY
May 24, 2019       Dear Dr. Trisha Sebastian:    Thank you for referring your patient, Erick Henriquez (: 1950), to the Pulmonary Rehabilitation Program at [address] [facility]. Mrs. Marilu Gomez is a good candidate for the program and should see improvements with regular participation. We will be working to increase your patient's endurance and self care over the next 12 weeks. We will contact you with any issues or concerns that may arise, or you can follow your patient's progress through [facility] at any time. We will send you a final summary report when the program is completed. Again, thank you for your referral. If we can be of further assistance, please feel free to contact the Cardiopulmonary Rehab staff at [phone].     Sincerely,      Kourtney Danielle, RRT, RCP  Pulmonary Rehab Specialist   [facility]    CC: File

## 2019-06-10 ENCOUNTER — HOSPITAL ENCOUNTER (OUTPATIENT)
Dept: CARDIAC REHAB | Age: 69
Discharge: HOME | End: 2019-06-10
Payer: MEDICARE

## 2019-06-10 VITALS — WEIGHT: 205.8 LBS | BODY MASS INDEX: 37.87 KG/M2 | HEIGHT: 62 IN

## 2019-06-10 PROCEDURE — G0239 OTH RESP PROC, GROUP: HCPCS

## 2019-06-13 ENCOUNTER — HOSPITAL ENCOUNTER (OUTPATIENT)
Dept: CARDIAC REHAB | Age: 69
Discharge: HOME | End: 2019-06-13
Payer: MEDICARE

## 2019-06-13 PROCEDURE — G0239 OTH RESP PROC, GROUP: HCPCS

## 2019-06-18 ENCOUNTER — HOSPITAL ENCOUNTER (OUTPATIENT)
Dept: CARDIAC REHAB | Age: 69
Discharge: HOME | End: 2019-06-18
Payer: MEDICARE

## 2019-06-18 VITALS — BODY MASS INDEX: 37.71 KG/M2 | WEIGHT: 206.2 LBS

## 2019-06-18 PROCEDURE — G0239 OTH RESP PROC, GROUP: HCPCS

## 2019-06-20 ENCOUNTER — HOSPITAL ENCOUNTER (OUTPATIENT)
Dept: CARDIAC REHAB | Age: 69
Discharge: HOME | End: 2019-06-20
Payer: MEDICARE

## 2019-06-20 PROCEDURE — G0239 OTH RESP PROC, GROUP: HCPCS

## 2019-06-25 ENCOUNTER — HOSPITAL ENCOUNTER (OUTPATIENT)
Dept: CARDIAC REHAB | Age: 69
Discharge: HOME | End: 2019-06-25
Payer: MEDICARE

## 2019-06-25 VITALS — WEIGHT: 202.4 LBS | BODY MASS INDEX: 37.02 KG/M2

## 2019-06-25 PROCEDURE — G0239 OTH RESP PROC, GROUP: HCPCS

## 2019-06-27 ENCOUNTER — HOSPITAL ENCOUNTER (OUTPATIENT)
Dept: CARDIAC REHAB | Age: 69
Discharge: HOME | End: 2019-06-27
Payer: MEDICARE

## 2019-06-27 PROCEDURE — G0239 OTH RESP PROC, GROUP: HCPCS

## 2019-07-02 ENCOUNTER — HOSPITAL ENCOUNTER (OUTPATIENT)
Dept: CARDIAC REHAB | Age: 69
Discharge: HOME | End: 2019-07-02
Payer: MEDICARE

## 2019-07-02 VITALS — BODY MASS INDEX: 36.98 KG/M2 | WEIGHT: 202.2 LBS

## 2019-07-02 PROCEDURE — G0239 OTH RESP PROC, GROUP: HCPCS

## 2019-07-09 ENCOUNTER — HOSPITAL ENCOUNTER (OUTPATIENT)
Dept: CARDIAC REHAB | Age: 69
Discharge: HOME | End: 2019-07-09
Payer: MEDICARE

## 2019-07-09 VITALS — BODY MASS INDEX: 37.24 KG/M2 | WEIGHT: 203.6 LBS

## 2019-07-09 PROCEDURE — G0239 OTH RESP PROC, GROUP: HCPCS

## 2019-07-11 ENCOUNTER — HOSPITAL ENCOUNTER (OUTPATIENT)
Dept: CARDIAC REHAB | Age: 69
Discharge: HOME | End: 2019-07-11
Payer: MEDICARE

## 2019-07-11 PROCEDURE — G0239 OTH RESP PROC, GROUP: HCPCS

## 2019-07-11 NOTE — PROGRESS NOTES
78year old female, with asthma and interstitial lung dx and ulcerative colitis enrolled in pulmonary rehabilitation, seen today for initial nutrition counseling. States good energy level and appetite today. Stated Nutrition Goals: \"To improve energy level, feel healthier and age well; Not want to be a burden on my children\". Medical History: arthritis, CAD, Chronic pain, depression, DM, HTN, obesity, Sleep apnea- uses her bipap daily,     Nutrition related medications/supplements : OFEV- for ulcerative colitis. Tramadol, metformin, statin,  Cymbalta. Nutrition Related Labs:     Social History: Retired , lives with spouse Samuel Hung. Supported by her daughter Abdirashid Walters. Sleep: states she sleeps well with her bipap machine. Physical Activity: Rehabilitation 2x per week, off days pt enjoys time in the garden. Lately is been too hot to for her. Food and Nutrition Intake History:   Pt is responsible for food purchasing and shopping. She consumes 3 meals per day. Likes vegetables and fruits, does admit to stress eating. States she enjoys eating healthy, however her main barrier is her 's food preferences. Eats out 2-3x per week. Stated 1 day diet recall includes Breakfast: Cereal (Cheerios) + 2% milk, 8 ounces of OJ, 2 cups of coffee with powdered creamer and sweet/low; Lunch: sandwich with sliced chicken, garcias and water:  Dinner: Salad, 1.5 slices meat/cheese pizza (from take out), water; PM snack: cottage cheese and pineapples; Beverages: 16 ounces of water, coffee, 8 ounces OJ, diet ginger ale, and occasional coke. Alcohol use: none    GI: Ulcerative Colitis controlled. Anthropometric Data:  Height: 5' 2\" (1.575 m). Weight: 92.4 kg (203 lb 9.6 oz).; IBW (+10%)=   BMI: 37.24 kg/m²; BMI classification of overweight for age > 72.    Waist measurement (inches): 47.     Estimated Nutritional Needs:  Calories: MSJ x 1.6 for weight maintenance: MSJ x 1.6 (-500 kcal) for weight loss: 2000kcal/day   Protein: 100g (20% of estimated energy needs); GFR >  60    Stage of Behavior Change: Preparation     Nutrition Diagnosis:    Imbalanced intake of nutrients related to food choices/ food related knowledge evidenced by food recall indicated no meeting standard intake of f/v and increase intake of sodium and waist circumference > 35, and     Nutrition Intervention:  1. Nutrition Education: Educated patient  on components of Mediterranean/ DASH  meal pattern. Guidelines for saturated fat, sodium and added sugars reviewed and elimination of trans fats recommended. Increasing consumption of vegetables, fruits, whole grains, poultry, fish, legumes, and nuts reviewed for cardioprotective benefits. Stressed limiting intake of sweets, sugar-sweetened beverages, and red meats. Emphasis on cardioprotective and anti inflammatory benefits of daily intake of plant-based eating pattern was emphasized today. 2 . Demonstrated portions sizes. 3. Nutrition counseling: Counseled pt and family on the relationship between nutrition and lifestyle factors on health goals and research supporting reduced risk of cardiovascular disease/ weight loss. Offered suggestions to cope with stress, alternative behaviors for emotional eating. Discussed Mindful Eating techniques. Handouts provided for home use :   Lab/body comp,   Heart healthy eating pattern, 1 day sample menu (CHO/Heart healthy menu), Recipes, Recipe modification tips, , CrossTx plate method,Tips for reducing sodium,    Nutrition Goals: To improve diet quality, by decreasing intake of, sodium and refined CHO, and increasing intake of f/v by end of  pulmonary rehabilitation. 5-10% weight loss by increasing f/v and end of  pulmonary rehabilitation. Monitoring/Evaluation: RD to follow up with participant during rehab sessions for questions and assessment of progression toward goals.     Compliance: Pt agreeable to start increasing intake of f/v and decreasing sodium. Barriers: Per patient, spouses habits seem to be the biggest barrier.      Georges Barrett MS, RD, LD  Cardiac/Pulmonary Rehab Dietitian

## 2019-07-16 ENCOUNTER — HOSPITAL ENCOUNTER (OUTPATIENT)
Dept: CARDIAC REHAB | Age: 69
Discharge: HOME | End: 2019-07-16
Payer: MEDICARE

## 2019-07-16 VITALS — WEIGHT: 201 LBS | BODY MASS INDEX: 36.76 KG/M2

## 2019-07-16 PROCEDURE — G0239 OTH RESP PROC, GROUP: HCPCS

## 2019-07-18 ENCOUNTER — HOSPITAL ENCOUNTER (OUTPATIENT)
Dept: CARDIAC REHAB | Age: 69
Discharge: HOME | End: 2019-07-18
Payer: MEDICARE

## 2019-07-18 PROCEDURE — G0239 OTH RESP PROC, GROUP: HCPCS

## 2019-07-23 ENCOUNTER — HOSPITAL ENCOUNTER (OUTPATIENT)
Dept: CARDIAC REHAB | Age: 69
Discharge: HOME | End: 2019-07-23
Payer: MEDICARE

## 2019-07-23 VITALS — WEIGHT: 202.2 LBS | BODY MASS INDEX: 36.98 KG/M2

## 2019-07-23 PROCEDURE — G0239 OTH RESP PROC, GROUP: HCPCS

## 2019-07-25 ENCOUNTER — HOSPITAL ENCOUNTER (OUTPATIENT)
Dept: CARDIAC REHAB | Age: 69
Discharge: HOME | End: 2019-07-25
Payer: MEDICARE

## 2019-07-25 PROCEDURE — G0239 OTH RESP PROC, GROUP: HCPCS

## 2019-07-30 ENCOUNTER — APPOINTMENT (OUTPATIENT)
Dept: CARDIAC REHAB | Age: 69
End: 2019-07-30
Payer: MEDICARE

## 2019-08-01 ENCOUNTER — HOSPITAL ENCOUNTER (OUTPATIENT)
Dept: CARDIAC REHAB | Age: 69
Discharge: HOME | End: 2019-08-01
Payer: MEDICARE

## 2019-08-01 VITALS — WEIGHT: 202.4 LBS | BODY MASS INDEX: 37.02 KG/M2

## 2019-08-01 PROCEDURE — G0239 OTH RESP PROC, GROUP: HCPCS

## 2019-08-06 ENCOUNTER — HOSPITAL ENCOUNTER (OUTPATIENT)
Dept: CARDIAC REHAB | Age: 69
Discharge: HOME | End: 2019-08-06
Payer: MEDICARE

## 2019-08-06 VITALS — WEIGHT: 201.8 LBS | BODY MASS INDEX: 36.91 KG/M2

## 2019-08-06 PROCEDURE — G0239 OTH RESP PROC, GROUP: HCPCS

## 2019-08-08 ENCOUNTER — HOSPITAL ENCOUNTER (OUTPATIENT)
Dept: CARDIAC REHAB | Age: 69
Discharge: HOME | End: 2019-08-08
Payer: MEDICARE

## 2019-08-08 PROCEDURE — G0239 OTH RESP PROC, GROUP: HCPCS

## 2019-08-13 ENCOUNTER — HOSPITAL ENCOUNTER (OUTPATIENT)
Dept: CARDIAC REHAB | Age: 69
Discharge: HOME | End: 2019-08-13
Payer: MEDICARE

## 2019-08-13 VITALS — BODY MASS INDEX: 36.65 KG/M2 | WEIGHT: 200.4 LBS

## 2019-08-13 PROCEDURE — G0239 OTH RESP PROC, GROUP: HCPCS

## 2019-08-15 ENCOUNTER — HOSPITAL ENCOUNTER (OUTPATIENT)
Dept: CARDIAC REHAB | Age: 69
Discharge: HOME | End: 2019-08-15
Payer: MEDICARE

## 2019-08-15 PROCEDURE — G0239 OTH RESP PROC, GROUP: HCPCS

## 2019-08-20 ENCOUNTER — HOSPITAL ENCOUNTER (OUTPATIENT)
Dept: CARDIAC REHAB | Age: 69
Discharge: HOME | End: 2019-08-20
Payer: MEDICARE

## 2019-08-20 PROCEDURE — G0239 OTH RESP PROC, GROUP: HCPCS

## 2019-08-22 ENCOUNTER — HOSPITAL ENCOUNTER (OUTPATIENT)
Dept: CARDIAC REHAB | Age: 69
Discharge: HOME | End: 2019-08-22
Payer: MEDICARE

## 2019-08-22 VITALS — BODY MASS INDEX: 36.8 KG/M2 | WEIGHT: 201.2 LBS

## 2019-08-22 PROCEDURE — G0239 OTH RESP PROC, GROUP: HCPCS

## 2019-08-27 ENCOUNTER — HOSPITAL ENCOUNTER (OUTPATIENT)
Dept: CARDIAC REHAB | Age: 69
Discharge: HOME | End: 2019-08-27
Payer: MEDICARE

## 2019-08-27 PROCEDURE — G0239 OTH RESP PROC, GROUP: HCPCS

## 2019-08-29 ENCOUNTER — APPOINTMENT (OUTPATIENT)
Dept: CARDIAC REHAB | Age: 69
End: 2019-08-29
Payer: MEDICARE

## 2019-09-03 ENCOUNTER — HOSPITAL ENCOUNTER (OUTPATIENT)
Dept: CARDIAC REHAB | Age: 69
End: 2019-09-03
Payer: MEDICARE

## 2019-09-05 ENCOUNTER — HOSPITAL ENCOUNTER (OUTPATIENT)
Dept: CARDIAC REHAB | Age: 69
Discharge: HOME | End: 2019-09-05
Payer: MEDICARE

## 2019-09-05 VITALS — WEIGHT: 201.4 LBS | BODY MASS INDEX: 37.06 KG/M2 | HEIGHT: 62 IN

## 2019-09-10 ENCOUNTER — HOSPITAL ENCOUNTER (OUTPATIENT)
Dept: CARDIAC REHAB | Age: 69
Discharge: HOME | End: 2019-09-10
Payer: MEDICARE

## 2019-09-10 VITALS — BODY MASS INDEX: 36.95 KG/M2 | WEIGHT: 202 LBS

## 2019-09-10 PROCEDURE — G0239 OTH RESP PROC, GROUP: HCPCS

## 2019-09-11 ENCOUNTER — HOSPITAL ENCOUNTER (OUTPATIENT)
Dept: CARDIAC REHAB | Age: 69
Discharge: HOME | End: 2019-09-11
Payer: MEDICARE

## 2019-09-11 PROCEDURE — G0239 OTH RESP PROC, GROUP: HCPCS

## 2019-09-12 ENCOUNTER — HOSPITAL ENCOUNTER (OUTPATIENT)
Dept: CARDIAC REHAB | Age: 69
End: 2019-09-12
Payer: MEDICARE

## 2019-09-17 ENCOUNTER — HOSPITAL ENCOUNTER (OUTPATIENT)
Dept: CARDIAC REHAB | Age: 69
Discharge: HOME | End: 2019-09-17
Payer: MEDICARE

## 2019-09-17 VITALS — BODY MASS INDEX: 37.2 KG/M2 | WEIGHT: 203.4 LBS

## 2019-09-17 PROCEDURE — G0239 OTH RESP PROC, GROUP: HCPCS

## 2020-11-11 ENCOUNTER — HOSPITAL ENCOUNTER (OUTPATIENT)
Dept: CT IMAGING | Age: 70
Discharge: HOME | End: 2020-11-11
Attending: INTERNAL MEDICINE
Payer: MEDICARE

## 2020-11-11 DIAGNOSIS — J84.9 ILD (INTERSTITIAL LUNG DISEASE) (HCC): ICD-10-CM

## 2020-11-11 DIAGNOSIS — R91.8 PULMONARY INFILTRATES: ICD-10-CM

## 2020-11-11 PROCEDURE — 71250 CT THORAX DX C-: CPT

## 2021-05-24 ENCOUNTER — HOSPITAL ENCOUNTER (OUTPATIENT)
Dept: SLEEP MEDICINE | Age: 71
Discharge: HOME | End: 2021-05-24
Payer: MEDICARE

## 2021-05-24 PROCEDURE — 95811 POLYSOM 6/>YRS CPAP 4/> PARM: CPT

## 2022-03-18 PROBLEM — F33.9 RECURRENT DEPRESSION (HCC): Status: ACTIVE | Noted: 2018-01-10

## 2022-03-20 PROBLEM — M79.605 PAIN OF LEFT LOWER EXTREMITY: Status: ACTIVE | Noted: 2018-01-10

## 2022-05-25 ENCOUNTER — PREP FOR PROCEDURE (OUTPATIENT)
Dept: ADMINISTRATIVE | Age: 72
End: 2022-05-25

## 2022-06-01 RX ORDER — ACETAMINOPHEN 325 MG/1
650 TABLET ORAL EVERY 6 HOURS PRN
COMMUNITY

## 2022-06-01 RX ORDER — NITROGLYCERIN 0.4 MG/1
TABLET SUBLINGUAL
COMMUNITY
Start: 2016-02-28 | End: 2022-08-23 | Stop reason: SDUPTHER

## 2022-06-01 NOTE — PROGRESS NOTES
Called and confirmed scheduled procedure for patient. Informed on patients arrival time (0830), entry location ([address] and  policy. Opportunity for questions provided, no voiced concerns.     Patient not taking Tikosyn  Patient not on Dialysis  Patient with no COVID signs/symptoms

## 2022-06-01 NOTE — PERIOP NOTE
Patient verified name, , and procedure. Type: 1a; abbreviated assessment per anesthesia guidelines    Labs per anesthesia: SQBS DOS    Instructed pt that they will be notified the day before their procedure by the GI Lab for time of arrival if their procedure is [facility] and Pre-op for [place] cases. Arrival times should be called by 5 pm. If no phone is received the patient should contact their respective hospital. The GI lab telephone number is [phone] and ES Pre-op is [phone]. Follow diet and prep instructions per office including NPO status. If patient has NOT received instructions from office patient is advised to call surgeon office, verbalizes understanding. Bath or shower the night before and the am of surgery with non-moisturizing soap. No lotions, oils, powders, cologne on skin. No make up, eye make up or jewelry. Wear loose fitting comfortable, clean clothing. Must have adult present in building the entire time . Medications for the day of procedure: clonidine, esomeprazole, flonase, labetalol; patient to hold: benazepril, chlorthalidone, jardiance, metformin    The following discharge instructions reviewed with patient: medication given during procedure may cause drowsiness for several hours, therefore, do not drive or operate machinery for remainder of the day. You may not drink alcohol on the day of your procedure, please resume regular diet and activity unless otherwise directed. You may experience abdominal distention for several hours that is relieved by the passage of gas. Contact your physician if you have any of the following: fever or chills, severe abdominal pain or excessive amount of bleeding or a large amount when having a bowel movement.  Occasional specks of blood with bowel movement would not be unusual.

## 2022-06-02 ENCOUNTER — ANESTHESIA EVENT (OUTPATIENT)
Dept: ENDOSCOPY | Age: 72
End: 2022-06-02
Payer: MEDICARE

## 2022-06-02 ENCOUNTER — ANESTHESIA (OUTPATIENT)
Dept: ENDOSCOPY | Age: 72
End: 2022-06-02
Payer: MEDICARE

## 2022-06-02 ENCOUNTER — HOSPITAL ENCOUNTER (OUTPATIENT)
Age: 72
Setting detail: OUTPATIENT SURGERY
Discharge: HOME | End: 2022-06-02
Attending: INTERNAL MEDICINE | Admitting: INTERNAL MEDICINE
Payer: MEDICARE

## 2022-06-02 VITALS
SYSTOLIC BLOOD PRESSURE: 158 MMHG | TEMPERATURE: 97.7 F | HEIGHT: 62 IN | OXYGEN SATURATION: 92 % | DIASTOLIC BLOOD PRESSURE: 68 MMHG | HEART RATE: 51 BPM | BODY MASS INDEX: 37.17 KG/M2 | RESPIRATION RATE: 17 BRPM | WEIGHT: 202 LBS

## 2022-06-02 LAB
GLUCOSE BLD STRIP.AUTO-MCNC: 128 MG/DL (ref 65–100)
SERVICE CMNT-IMP: ABNORMAL

## 2022-06-02 PROCEDURE — 3700000001 HC ADD 15 MINUTES (ANESTHESIA): Performed by: INTERNAL MEDICINE

## 2022-06-02 PROCEDURE — 2709999900 HC NON-CHARGEABLE SUPPLY: Performed by: INTERNAL MEDICINE

## 2022-06-02 PROCEDURE — 3609010600 HC COLONOSCOPY POLYPECTOMY SNARE/COLD BIOPSY: Performed by: INTERNAL MEDICINE

## 2022-06-02 PROCEDURE — 2580000003 HC RX 258: Performed by: ANESTHESIOLOGY

## 2022-06-02 PROCEDURE — 88312 SPECIAL STAINS GROUP 1: CPT

## 2022-06-02 PROCEDURE — 82962 GLUCOSE BLOOD TEST: CPT

## 2022-06-02 PROCEDURE — 3700000000 HC ANESTHESIA ATTENDED CARE: Performed by: INTERNAL MEDICINE

## 2022-06-02 PROCEDURE — 7100000011 HC PHASE II RECOVERY - ADDTL 15 MIN: Performed by: INTERNAL MEDICINE

## 2022-06-02 PROCEDURE — 2500000003 HC RX 250 WO HCPCS

## 2022-06-02 PROCEDURE — 88305 TISSUE EXAM BY PATHOLOGIST: CPT

## 2022-06-02 PROCEDURE — 6360000002 HC RX W HCPCS: Performed by: STUDENT IN AN ORGANIZED HEALTH CARE EDUCATION/TRAINING PROGRAM

## 2022-06-02 PROCEDURE — 3609012400 HC EGD TRANSORAL BIOPSY SINGLE/MULTIPLE: Performed by: INTERNAL MEDICINE

## 2022-06-02 PROCEDURE — 7100000010 HC PHASE II RECOVERY - FIRST 15 MIN: Performed by: INTERNAL MEDICINE

## 2022-06-02 RX ORDER — SODIUM CHLORIDE, SODIUM LACTATE, POTASSIUM CHLORIDE, CALCIUM CHLORIDE 600; 310; 30; 20 MG/100ML; MG/100ML; MG/100ML; MG/100ML
INJECTION, SOLUTION INTRAVENOUS CONTINUOUS
Status: DISCONTINUED | OUTPATIENT
Start: 2022-06-02 | End: 2022-06-02 | Stop reason: HOSPADM

## 2022-06-02 RX ORDER — GLYCOPYRROLATE 0.2 MG/ML
INJECTION INTRAMUSCULAR; INTRAVENOUS PRN
Status: DISCONTINUED | OUTPATIENT
Start: 2022-06-02 | End: 2022-06-02 | Stop reason: SDUPTHER

## 2022-06-02 RX ORDER — SODIUM CHLORIDE 0.9 % (FLUSH) 0.9 %
5-40 SYRINGE (ML) INJECTION EVERY 12 HOURS SCHEDULED
Status: DISCONTINUED | OUTPATIENT
Start: 2022-06-02 | End: 2022-06-02 | Stop reason: HOSPADM

## 2022-06-02 RX ORDER — SODIUM CHLORIDE 9 MG/ML
INJECTION, SOLUTION INTRAVENOUS PRN
Status: DISCONTINUED | OUTPATIENT
Start: 2022-06-02 | End: 2022-06-02 | Stop reason: HOSPADM

## 2022-06-02 RX ORDER — FENTANYL CITRATE 50 UG/ML
INJECTION, SOLUTION INTRAMUSCULAR; INTRAVENOUS
Status: COMPLETED
Start: 2022-06-02 | End: 2022-06-02

## 2022-06-02 RX ORDER — SODIUM CHLORIDE 0.9 % (FLUSH) 0.9 %
5-40 SYRINGE (ML) INJECTION PRN
Status: DISCONTINUED | OUTPATIENT
Start: 2022-06-02 | End: 2022-06-02 | Stop reason: HOSPADM

## 2022-06-02 RX ORDER — MIDAZOLAM HYDROCHLORIDE 1 MG/ML
INJECTION INTRAMUSCULAR; INTRAVENOUS PRN
Status: DISCONTINUED | OUTPATIENT
Start: 2022-06-02 | End: 2022-06-02 | Stop reason: SDUPTHER

## 2022-06-02 RX ORDER — MIDAZOLAM HYDROCHLORIDE 1 MG/ML
INJECTION INTRAMUSCULAR; INTRAVENOUS
Status: COMPLETED
Start: 2022-06-02 | End: 2022-06-02

## 2022-06-02 RX ADMIN — MIDAZOLAM 1 MG: 1 INJECTION INTRAMUSCULAR; INTRAVENOUS at 09:50

## 2022-06-02 RX ADMIN — GLYCOPYRROLATE 0.2 MG: 0.2 INJECTION INTRAMUSCULAR; INTRAVENOUS at 09:47

## 2022-06-02 RX ADMIN — FENTANYL CITRATE 12.5 MCG: 50 INJECTION INTRAMUSCULAR; INTRAVENOUS at 09:43

## 2022-06-02 RX ADMIN — FENTANYL CITRATE 12.5 MCG: 50 INJECTION INTRAMUSCULAR; INTRAVENOUS at 09:48

## 2022-06-02 RX ADMIN — SODIUM CHLORIDE, POTASSIUM CHLORIDE, SODIUM LACTATE AND CALCIUM CHLORIDE: 600; 310; 30; 20 INJECTION, SOLUTION INTRAVENOUS at 09:06

## 2022-06-02 RX ADMIN — MIDAZOLAM 0.5 MG: 1 INJECTION INTRAMUSCULAR; INTRAVENOUS at 09:41

## 2022-06-02 RX ADMIN — FENTANYL CITRATE 25 MCG: 50 INJECTION INTRAMUSCULAR; INTRAVENOUS at 09:39

## 2022-06-02 RX ADMIN — MIDAZOLAM 0.5 MG: 1 INJECTION INTRAMUSCULAR; INTRAVENOUS at 09:36

## 2022-06-02 RX ADMIN — MIDAZOLAM 0.5 MG: 1 INJECTION INTRAMUSCULAR; INTRAVENOUS at 09:56

## 2022-06-02 RX ADMIN — MIDAZOLAM 1 MG: 1 INJECTION INTRAMUSCULAR; INTRAVENOUS at 09:32

## 2022-06-02 RX ADMIN — MIDAZOLAM 1 MG: 1 INJECTION INTRAMUSCULAR; INTRAVENOUS at 09:45

## 2022-06-02 RX ADMIN — FENTANYL CITRATE 50 MCG: 50 INJECTION INTRAMUSCULAR; INTRAVENOUS at 09:35

## 2022-06-02 ASSESSMENT — PAIN - FUNCTIONAL ASSESSMENT
PAIN_FUNCTIONAL_ASSESSMENT: NONE - DENIES PAIN

## 2022-06-02 ASSESSMENT — COPD QUESTIONNAIRES: CAT_SEVERITY: MODERATE

## 2022-06-02 NOTE — PROGRESS NOTES
VSS at discharge. No complaints noted. Education reviewed and signed with patient and her grandauter. Pt discharged via wheelchair by Brinda Chauhan [facility]. Patient to be driven home by her [facility].

## 2022-06-02 NOTE — PROCEDURES
COLONOSCOPY    DATE of PROCEDURE: 6/2/2022    MEDICATION:  MAC      INSTRUMENT: PCF    PROCEDURE: After obtaining informed consent, the patient was placed in the left lateral position and sedated. The endoscope was advanced to the cecum where the appendiceal orifice and ileocecal valve were identified. On withdrawal, the colon was carefully visualized in a 360 degree fashion. Retroflexion was performed in the rectum. The patient was taken to the recovery area in stable condition. FINDINGS:  Rectum: normal  Sigmoid: Severe diverticulosis with sigmoid tortuousity  Descending Colon: Severe diverticulosis   Transverse Colon:  4mm polyp removed with cold forceps. Diverticulosis  Ascending Colon: normal. No active colitis noted anywhere in the colon and representative biopsies taken for pathology. Cecum: normal. 3mm polyp removed with cold forceps. Terminal ileum: not seen    Estimated blood loss: 0-minimal         ASSESSMENT/PLAN:  1. F/up pathology  2. Next colo at the hospital given \"propofol allergy\" and she received fentanyl and versed here.       Minnie العلي MD  [facility] [place]

## 2022-06-02 NOTE — PROCEDURES
Esophagogastroduodenoscopy    DATE of PROCEDURE: 6/2/2022    MEDICATIONS ADMINISTERED: MAC    INSTRUMENT: GIF    PROCEDURE:  After obtaining informed consent, the patient was placed in the left lateral position and sedated. The endoscope was advanced under direct vision without difficulty. The esophagus, stomach (including retroflexed views) and duodenum were evaluated. The patient was taken to the recovery area in stable condition. FINDINGS:  ESOPHAGUS:  There is a 2cm hiatal hernia noted without esophagitis. No stricture noted. STOMACH:  There is no ulcers noted. Biopsies taken for pathology  DUODENUM: Normal     Estimated blood loss: 0-minimal     PLAN:  1.  fup pathology  2.   GERD sharita Caballero MD  [facility] [place]

## 2022-06-02 NOTE — H&P
[facility] Outpatient History and Physical Note         [facility] Outpatient History and Physical    Patient: Ade Lyons    Physician: Nona Quinn MD    Chief Complaint: hx of UC and GERD    History of Present Illness: 66 yr old female with hx of UC, CAD s/p stents, pulmonary fibrosis here for egd and colonoscopy. She states she has severe intolerance to propofol        Justification for Procedure: cancer prevention    PMH:   Past Medical History:   Diagnosis Date    Arthritis     tylenol arthritis    Asthma     daily inhler; used rescue inhaler 1 month ago (Nov 2018)- last used neb 1 week ago    CAD (coronary artery disease)     MI- 3/2016- had 1 stent    Chronic obstructive pulmonary disease ([address]     pt denies    Chronic pain     Crohn's disease ([address]     Diabetes mellitus type 2, controlled ([address]     type 2; avg fasting glucose- 100; last A1C= 6.0    GERD (gastroesophageal reflux disease)     controlled with esomeprazole    History of blood transfusion     s a child     History of pneumonia 04/2013    Pneumonia/asthma exacerbation, was on vent and transferred to [facility] for weaning; D/C home on oxygen.  HTN (hypertension)     controlled with med    Hypercholesteremia     statin taken at night    Morbid obesity ([address]     Psychiatric disorder     depression    Pulmonary fibrosis ([address]     Sleep apnea     uses BIPAP w 02 @ 3 l/min at HS     PSH: is required. Please contact your  to configure this [address]. Allergies: Allergies   Allergen Reactions    Latex Rash    Lidocaine Anaphylaxis     Allergic to all \"KERRIE\"    Propofol Other (See Comments)     Paralysis    Penicillins Other (See Comments)    Ceftriaxone Rash    Cephalosporins Rash    Morphine Rash    Pregabalin Rash     Prior to Admission medications    Medication Sig Start Date End Date Taking?  Authorizing Provider   nitroGLYCERIN (NITROSTAT) 0.4 MG SL tablet  2/28/16 Yes Historical Provider, MD   acetaminophen (TYLENOL) 325 mg tablet Take 650 mg by mouth every 6 hours as needed for Pain   Yes Historical Provider, MD   empagliflozin (JARDIANCE) 10 MG tablet Take 10 mg by mouth daily   Yes Historical Provider, MD   albuterol (PROVENTIL) (2.5 MG/3ML) 0.083% nebulizer solution Inhale 2.5 mg into the lungs every 4 hours as needed 10/21/14   Ar Automatic Reconciliation   aspirin 81 MG EC tablet Take by mouth every evening     Ar Automatic Reconciliation   atorvastatin (LIPITOR) 80 MG tablet Take 80 mg by mouth every evening  10/12/16   Ar Automatic Reconciliation   balsalazide (COLAZAL) 750 MG capsule Take 1,500 mg by mouth 2 times daily     Ar Automatic Reconciliation   benazepril (LOTENSIN) 40 MG tablet Take 40 mg by mouth daily 10/19/21   Ar Automatic Reconciliation   chlorthalidone (HYGROTON) 25 MG tablet TAKE ONE TABLET BY MOUTH ONE TIME DAILY 6/10/20   Ar Automatic Reconciliation   cloNIDine (CATAPRES) 0.1 MG tablet Take 0.1 mg by mouth 2 times daily 3/21/22   Ar Automatic Reconciliation   diclofenac sodium (VOLTAREN) 1 % GEL Apply topically 4 times daily    Ar Automatic Reconciliation   DULoxetine (CYMBALTA) 60 MG extended release capsule Take 30 mg by mouth 3 times daily    Ar Automatic Reconciliation   esomeprazole (NEXIUM) 40 MG delayed release capsule Take 40 mg by mouth daily 6/10/20   Ar Automatic Reconciliation   fluticasone (FLONASE) 50 MCG/ACT nasal spray 2 sprays by Nasal route daily    Ar Automatic Reconciliation   labetalol (NORMODYNE) 200 MG tablet TAKE ONE TABLET BY MOUTH TWICE A DAY 4/4/22   Ar Automatic Reconciliation   metFORMIN, OSM, (FORTAMET) 1000 MG extended release tablet Take 850 mg by mouth 2 times daily 1/10/16   Ar Automatic Reconciliation   mometasone-formoterol (DULERA) 100-5 MCG/ACT inhaler Inhale 2 puffs into the lungs 2 times daily 11/30/21   Ar Automatic Reconciliation   montelukast (SINGULAIR) 10 MG tablet Take 10 mg by mouth    Ar Automatic Reconciliation   polyethylene glycol (GLYCOLAX) 17 GM/SCOOP powder Take 17 g by mouth daily as needed    Ar Automatic Reconciliation       Physical exam:  Vital Signs: Blood pressure (!) 145/66, pulse 50, temperature 98.4 °F (36.9 °C), resp. rate 18, height 5' 2\" (1.575 m), weight 202 lb (91.6 kg), SpO2 93 %.   Gen: Alert and oriented  Lungs: Clear  Heart: RRR w/o m,g,r  Abdomen: Benign    Vivian Ireland MD  [facility]

## 2022-06-02 NOTE — ANESTHESIA PRE PROCEDURE
Department of Anesthesiology  Preprocedure Note       Name:  Donn Womack   Age:  66 y.o.  :  1950                                          MRN:  [id number]         Date:  2022      Surgeon: Luz Elena Grijalva):  La Nena Thompson MD    Procedure: Procedure(s):  EGD ESOPHAGOGASTRODUODENOSCOPY  COLORECTAL CANCER SCREENING, NOT HIGH RISK    Medications prior to admission:   Prior to Admission medications    Medication Sig Start Date End Date Taking?  Authorizing Provider   nitroGLYCERIN (NITROSTAT) 0.4 MG SL tablet  16  Yes Historical Provider, MD   acetaminophen (TYLENOL) 325 mg tablet Take 650 mg by mouth every 6 hours as needed for Pain   Yes Historical Provider, MD   empagliflozin (JARDIANCE) 10 MG tablet Take 10 mg by mouth daily   Yes Historical Provider, MD   albuterol (PROVENTIL) (2.5 MG/3ML) 0.083% nebulizer solution Inhale 2.5 mg into the lungs every 4 hours as needed 10/21/14   Ar Automatic Reconciliation   aspirin 81 MG EC tablet Take by mouth every evening     Ar Automatic Reconciliation   atorvastatin (LIPITOR) 80 MG tablet Take 80 mg by mouth every evening  10/12/16   Ar Automatic Reconciliation   balsalazide (COLAZAL) 750 MG capsule Take 1,500 mg by mouth 2 times daily     Ar Automatic Reconciliation   benazepril (LOTENSIN) 40 MG tablet Take 40 mg by mouth daily 10/19/21   Ar Automatic Reconciliation   chlorthalidone (HYGROTON) 25 MG tablet TAKE ONE TABLET BY MOUTH ONE TIME DAILY 6/10/20   Ar Automatic Reconciliation   cloNIDine (CATAPRES) 0.1 MG tablet Take 0.1 mg by mouth 2 times daily 3/21/22   Ar Automatic Reconciliation   diclofenac sodium (VOLTAREN) 1 % GEL Apply topically 4 times daily    Ar Automatic Reconciliation   DULoxetine (CYMBALTA) 60 MG extended release capsule Take 30 mg by mouth 3 times daily    Ar Automatic Reconciliation   esomeprazole (NEXIUM) 40 MG delayed release capsule Take 40 mg by mouth daily 6/10/20   Ar Automatic Reconciliation   fluticasone (FLONASE) 50 MCG/ACT nasal spray 2 sprays by Nasal route daily    Ar Automatic Reconciliation   labetalol (NORMODYNE) 200 MG tablet TAKE ONE TABLET BY MOUTH TWICE A DAY 4/4/22   Ar Automatic Reconciliation   metFORMIN, OSM, (FORTAMET) 1000 MG extended release tablet Take 850 mg by mouth 2 times daily 1/10/16   Ar Automatic Reconciliation   mometasone-formoterol (DULERA) 100-5 MCG/ACT inhaler Inhale 2 puffs into the lungs 2 times daily 11/30/21   Ar Automatic Reconciliation   montelukast (SINGULAIR) 10 MG tablet Take 10 mg by mouth    Ar Automatic Reconciliation   polyethylene glycol (GLYCOLAX) 17 GM/SCOOP powder Take 17 g by mouth daily as needed    Ar Automatic Reconciliation       Current medications:    Current Facility-Administered Medications   Medication Dose Route Frequency Provider Last Rate Last Admin    lactated ringers infusion   IntraVENous Continuous Rich Falk MD 50 mL/hr at 06/02/22 0906 New Bag at 06/02/22 0906    sodium chloride flush 0.9 % injection 5-40 mL  5-40 mL IntraVENous 2 times per day Rich Falk MD        sodium chloride flush 0.9 % injection 5-40 mL  5-40 mL IntraVENous PRN Rich Falk MD        0.9 % sodium chloride infusion   IntraVENous PRN Rich Falk MD           Allergies:     Allergies   Allergen Reactions    Latex Rash    Lidocaine Anaphylaxis     Allergic to all \"KERRIE\"    Propofol Other (See Comments)     Paralysis    Penicillins Other (See Comments)    Ceftriaxone Rash    Cephalosporins Rash    Morphine Rash    Pregabalin Rash       Problem List:    Patient Active Problem List   Diagnosis Code    [facility]interstitial [facility] disease) [facility] J84.9    Asthma J45.909    Chronic pain G89.29    Esophageal reflux K21.9    Long term (current) use of antithrombotics/antiplatelets U56.26    Recurrent depression ([facility] F33.9    Coronary atherosclerosis of native coronary artery I25.10    Type 2 diabetes mellitus without complication ([facility] V81.1    Obstructive sleep apnea G47.33  Hypoxia R09.02    Depression F32. A    Diabetes ([address] E11.9    Debility R53.81    HTN (hypertension) I10    Crohn's disease ([address] K50.90    Pain of left lower extremity M79.605    Bronchiectasis ([address] J47.9       Past Medical History:        Diagnosis Date    Arthritis     tylenol arthritis    Asthma     daily inhler; used rescue inhaler 1 month ago (Nov 2018)- last used neb 1 week ago    CAD (coronary artery disease)     MI- 3/2016- had 1 stent    Chronic obstructive pulmonary disease ([address]     pt denies    Chronic pain     Crohn's disease ([address]     Diabetes mellitus type 2, controlled ([address]     type 2; avg fasting glucose- 100; last A1C= 6.0    GERD (gastroesophageal reflux disease)     controlled with esomeprazole    History of blood transfusion     s a child     History of pneumonia 04/2013    Pneumonia/asthma exacerbation, was on vent and [facility]; D/C home on oxygen.     HTN (hypertension)     controlled with med    Hypercholesteremia     statin taken at night    Morbid obesity ([address]     Psychiatric disorder     depression    Pulmonary fibrosis ([address]     Sleep apnea     uses BIPAP w 02 @ 3 l/min at HS       Past Surgical History:        Procedure Laterality Date    CARDIAC CATHETERIZATION  03/01/2016    stent x1    COLONOSCOPY      ENDOSCOPY, COLON, DIAGNOSTIC      HEENT      Sinus sx    NEUROLOGICAL SURGERY  12/04/2018    ENMA X 1       Social History:    Social History     Tobacco Use    Smoking status: Never Smoker    Smokeless tobacco: Never Used   Substance Use Topics    Alcohol use: No     Alcohol/week: 0.0 standard drinks                                Counseling given: Not Answered      Vital Signs (Current):   Vitals:    06/01/22 1512 06/02/22 0913   BP:  (!) 145/66   Pulse:  50   Resp:  18   Temp:  98.4 °F (36.9 °C)   SpO2:  93%   Weight: 202 lb (91.6 kg) 202 lb (91.6 kg)   Height: 5' 1\" (1.549 m) 5' 2\" (1.575 m) BP Readings from Last 3 Encounters:   06/02/22 (!) 145/66   05/02/22 (!) 112/58   02/22/22 138/64       NPO Status: Time of last liquid consumption: 0400                        Time of last solid consumption: 1900                        Date of last liquid consumption: 06/02/22                        Date of last solid food consumption: 05/31/22    BMI:   Wt Readings from Last 3 Encounters:   06/02/22 202 lb (91.6 kg)   05/02/22 202 lb (91.6 kg)   02/22/22 209 lb 3.2 oz (94.9 kg)     Body mass index is 36.95 kg/m². CBC: No results found for: WBC, RBC, HGB, HCT, MCV, RDW, PLT    CMP: No results found for: NA, K, CL, CO2, BUN, CREATININE, GFRAA, AGRATIO, LABGLOM, GLUCOSE, GLU, PROT, CALCIUM, BILITOT, ALKPHOS, AST, ALT    POC Tests:   Recent Labs     06/02/22  0856   POCGLU 128*       Coags: No results found for: PROTIME, INR, APTT    HCG (If Applicable): No results found for: PREGTESTUR, PREGSERUM, HCG, HCGQUANT     ABGs: No results found for: PHART, PO2ART, SIU5JND, LUQ1WUA, BEART, A1BNQFRW     Type & Screen (If Applicable):  No results found for: LABABO, LABRH    Drug/Infectious Status (If Applicable):  No results found for: HIV, HEPCAB    COVID-19 Screening (If Applicable):   Lab Results   Component Value Date    COVID19 Not Detected 10/30/2020           Anesthesia Evaluation    Airway: Mallampati: II  TM distance: <3 FB   Neck ROM: full  Mouth opening: > = 3 FB   Dental: normal exam         Pulmonary: breath sounds clear to auscultation  (+) COPD: moderate,  sleep apnea:  asthma:                            Cardiovascular:    (+) hypertension:, CAD:, CABG/stent:,         Rhythm: regular  Rate: normal                    Neuro/Psych:   (+) psychiatric history:            GI/Hepatic/Renal:   (+) GERD:,           Endo/Other:    (+) DiabetesType II DM, , .                 Abdominal:             Vascular:           Other Findings:           Anesthesia Plan      TIVA     ASA 4     (Will use fentanyl/versed due to undocumented allergy to propofol. It was blamed for weakness and paralysis after severe asthma in 2013 requiring ventilation for 2 weeks. Discussed with patient and daughter and they wish to avoid propofol. Increased awareness agreed to.)        Anesthetic plan and risks discussed with patient.                         Cristian Carvalho MD   6/2/2022

## 2022-06-03 NOTE — ANESTHESIA POSTPROCEDURE EVALUATION
Department of Anesthesiology  Postprocedure Note    Patient: Ruddy Leung  MRN: [id number]  YOB: 1950  Date of evaluation: 6/3/2022  Time:  5:31 PM     Procedure Summary     Date: 06/02/22 Room / Location: [facility] ENDO 03 / [facility] ENDOSCOPY    Anesthesia Start: 0931 Anesthesia Stop: 1021    Procedures:       EGD BIOPSY (N/A Upper GI Region)      COLONOSCOPY POLYPECTOMY AND COLD BIOPSY (N/A Lower GI Region) Diagnosis:       Ulcerative colitis with complication, unspecified location ([facility]      Personal history of cardiovascular disorder      (ULCERATIVE COLITIS WITHOUT COMPLICATIONS)    Surgeons: Minnie العلي MD Responsible Provider: Ann Dejesus MD    Anesthesia Type: MAC ASA Status: 4          Anesthesia Type: MAC    Colten Phase I: Colten Score: 10    Colten Phase II: Colten Score: 10    Last vitals: Reviewed and per EMR flowsheets.        Anesthesia Post Evaluation    Patient location during evaluation: PACU  Patient participation: complete - patient participated  Level of consciousness: awake and alert  Airway patency: patent  Nausea & Vomiting: no nausea and no vomiting  Complications: no  Cardiovascular status: hemodynamically stable  Respiratory status: acceptable, nonlabored ventilation and spontaneous ventilation  Hydration status: euvolemic  Comments: BP (!) 158/68   Pulse 51   Temp 97.7 °F (36.5 °C) (Skin)   Resp 17   Ht 5' 2\" (1.575 m)   Wt 202 lb (91.6 kg)   SpO2 92%   BMI 36.95 kg/m²     Multimodal analgesia pain management approach

## 2022-06-21 RX ORDER — SODIUM CHLORIDE 0.9 % (FLUSH) 0.9 %
5-40 SYRINGE (ML) INJECTION PRN
Status: CANCELLED | OUTPATIENT
Start: 2022-06-21

## 2022-06-21 RX ORDER — SODIUM CHLORIDE 0.9 % (FLUSH) 0.9 %
5-40 SYRINGE (ML) INJECTION EVERY 12 HOURS SCHEDULED
Status: CANCELLED | OUTPATIENT
Start: 2022-06-21

## 2022-06-21 RX ORDER — SODIUM CHLORIDE 9 MG/ML
INJECTION, SOLUTION INTRAVENOUS PRN
Status: CANCELLED | OUTPATIENT
Start: 2022-06-21

## 2022-08-01 NOTE — PROGRESS NOTES
[address], [address] [facility]  [phone]        Name:  Emi French  YOB: 1950  MRN:  [id number]    Office visit  8/2/2022      Chief Complaint   Patient presents with    Asthma           HISTORY OF PRESENT ILLNESS:  The patient is a 67year old female who is seen for follow up of bronchiectasis, asthma, NEFTALI, and hypoxemia. Remains on Dulera bid. Denies any cough or wheezing. BETANCOURT has improved. Wears BiPAP and O2 at 3 lpm with sleep. YARON was ordered last visit, but hasn't been obtained. She had an echo at [facility] 1/20/22 which revealed right ventricular systolic pressures of 05-76 mm Hg. She is actually off of Lasix currently and has improved LE edema. ASSESSMENT/PLAN:  (Medical Decision Making)  Below is the assessment and plan developed based on review of pertinent history, physical exam, labs, studies, and medications. Encounter Diagnoses   Name Primary? Moderate persistent asthma without complication  --continue Dulera   Yes    Bronchiectasis without complication ([address]  --continue albuterol       Obstructive sleep apnea  --YARON on BiPAP only       Hypoxia  --see above       Pulmonary hypertension ([address]  --follow up echo              Orders Placed This Encounter   Procedures    Ambulatory referral to Cardiology     Referral Priority:   Routine     Referral Type:   Consult for Advice and Opinion     Referral Reason:   Specialty Services Required     Number of Visits Requested:   1    DME - DURABLE MEDICAL EQUIPMENT     Please send out Overnight Oximetry on CPAP only  Please Fax results to: [phone]      No orders of the defined types were placed in this encounter. Follow up with Dr. Caty Benedict in 6 months. Collaborating physician is Dr. Ca Diaz.     NATALIA Machuca, KIMMY, APRN - CNP  Electronically signed          ~~~~~~~~~~~~~~~~~~~~~~~~~~~~~~~~~~~~~~~~  REFERENCE INFORMATION    Past Medical History: Diagnosis Date    Arthritis     tylenol arthritis    Asthma     daily inhler; used rescue inhaler 1 month ago (Nov 2018)- last used neb 1 week ago    CAD (coronary artery disease)     MI- 3/2016- had 1 stent    Chronic obstructive pulmonary disease ([address]     pt denies    Chronic pain     Crohn's disease ([address]     Diabetes mellitus type 2, controlled ([address]     type 2; avg fasting glucose- 100; last A1C= 6.0    GERD (gastroesophageal reflux disease)     controlled with esomeprazole    History of blood transfusion     s a child     History of pneumonia 04/2013    Pneumonia/asthma exacerbation, was on vent and transferred [facility] for weaning; D/C home on oxygen. HTN (hypertension)     controlled with med    Hypercholesteremia     statin taken at night    Morbid obesity ([address]     Psychiatric disorder     depression    Pulmonary fibrosis ([facility]     Sleep apnea     uses BIPAP w 02 @ 3 l/min at HS       Past Surgical History:   Procedure Laterality Date    CARDIAC CATHETERIZATION  03/01/2016    stent x1    COLONOSCOPY      COLONOSCOPY N/A 6/2/2022    COLONOSCOPY POLYPECTOMY AND COLD BIOPSY performed by Juan Sumner MD at [address] [place] [address], DIAGNOSTIC      HEENT      Sinus sx    NEUROLOGICAL SURGERY  12/04/2018    ENMA X 1    UPPER GASTROINTESTINAL ENDOSCOPY N/A 6/2/2022    EGD BIOPSY performed by Juan Sumner MD at [facility] ENDOSCOPY       Family History   Problem Relation Age of Onset    Heart Disease Mother     Liver Disease Father         alcohol related    Diabetes Sister        Social History     Socioeconomic History    Marital status:       Spouse name: Not on file    Number of children: Not on file    Years of education: Not on file    Highest education level: Not on file   Occupational History    Not on file   Tobacco Use    Smoking status: Never    Smokeless tobacco: Never   Vaping Use    Vaping Use: Never used   Substance and Sexual Activity    Alcohol use: No     Alcohol/week: 0.0 standard drinks    Drug use: No    Sexual activity: Not on file   Other Topics Concern    Not on file   Social History Narrative    , lives with . She works as a  for the [employer] She is a lifelong nonsmoker/drinker. Social Determinants of Health     Financial Resource Strain: Not on file   Food Insecurity: Not on file   Transportation Needs: Not on file   Physical Activity: Not on file   Stress: Not on file   Social Connections: Not on file   Intimate Partner Violence: Not on file   Housing Stability: Not on file       Patient Active Problem List   Diagnosis    Asthma    Chronic pain    Esophageal reflux    Long term (current) use of antithrombotics/antiplatelets    Recurrent depression ([address]    Coronary atherosclerosis of native coronary artery    Type 2 diabetes mellitus without complication ([facility]    Obstructive sleep apnea    Hypoxia    Depression    Diabetes ([facility]    Debility    HTN (hypertension)    Crohn's disease (HCC)    Pain of left lower extremity          Allergies   Allergen Reactions    Latex Rash    Lidocaine Anaphylaxis     Allergic to all \"KERRIE\"    Propofol Other (See Comments)     Paralysis    Penicillins Other (See Comments)    Ceftriaxone Rash    Cephalosporins Rash    Morphine Rash    Pregabalin Rash       Current Outpatient Medications   Medication Sig    OXYGEN Inhale into the lungs 3LPM qhs w/ Bipap    BiPAP Machine MISC by Does not apply route 2/ 3LPM or O2    vitamin B-12 (CYANOCOBALAMIN) 1000 MCG tablet Take 1,000 mcg by mouth in the morning.     nitroGLYCERIN (NITROSTAT) 0.4 MG SL tablet     acetaminophen (TYLENOL) 325 mg tablet Take 650 mg by mouth every 6 hours as needed for Pain    empagliflozin (JARDIANCE) 10 MG tablet Take 10 mg by mouth daily    albuterol (PROVENTIL) (2.5 MG/3ML) 0.083% nebulizer solution Inhale 2.5 mg into the lungs every 4 hours as needed    aspirin 81 MG EC tablet Take by mouth every evening     atorvastatin (LIPITOR) 80 MG tablet Take 80 mg by mouth every evening     balsalazide (COLAZAL) 750 MG capsule Take 1,500 mg by mouth 2 times daily     benazepril (LOTENSIN) 40 MG tablet Take 40 mg by mouth daily    chlorthalidone (HYGROTON) 25 MG tablet TAKE ONE TABLET BY MOUTH ONE TIME DAILY    cloNIDine (CATAPRES) 0.1 MG tablet Take 0.1 mg by mouth 2 times daily    diclofenac sodium (VOLTAREN) 1 % GEL Apply topically 4 times daily    DULoxetine (CYMBALTA) 60 MG extended release capsule Take 30 mg by mouth 3 times daily    esomeprazole (NEXIUM) 40 MG delayed release capsule Take 40 mg by mouth daily    fluticasone (FLONASE) 50 MCG/ACT nasal spray 2 sprays by Nasal route daily    labetalol (NORMODYNE) 200 MG tablet TAKE ONE TABLET BY MOUTH TWICE A DAY    metFORMIN, OSM, (FORTAMET) 1000 MG extended release tablet Take 850 mg by mouth 2 times daily    mometasone-formoterol (DULERA) 100-5 MCG/ACT inhaler Inhale 2 puffs into the lungs 2 times daily    montelukast (SINGULAIR) 10 MG tablet Take 10 mg by mouth    polyethylene glycol (GLYCOLAX) 17 GM/SCOOP powder Take 17 g by mouth daily as needed     No current facility-administered medications for this visit. Review of Systems   Constitutional:  Negative for chills, diaphoresis, fatigue and fever. Cardiovascular:  Negative for chest pain, palpitations and leg swelling. Gastrointestinal:  Negative for abdominal pain, constipation, diarrhea, nausea and vomiting. Neurological:  Negative for dizziness, tremors, seizures, syncope, weakness and headaches. PHYSICAL EXAM:    Vitals:    08/02/22 1439   BP: 139/71   Pulse: 53   Resp: 17   Temp: 97.9 °F (36.6 °C)   TempSrc: Temporal   SpO2: 94%  Comment: RA   Weight: 201 lb 3.2 oz (91.3 kg)   Height: 5' 1\" (1.549 m)      Body mass index is 38.02 kg/m². GENERAL APPEARANCE:  The patient is obese and in no respiratory distress. HEENT:  PERRL. Conjunctivae unremarkable. Nasal mucosa is without epistaxis, exudate, or polyps. Gums and dentition are unremarkable. There is no oropharyngeal narrowing. TMs are clear. NECK/LYMPHATIC:  Symmetrical with no elevation of jugular venous pulsation. Trachea midline. No thyroid enlargement. No cervical adenopathy. LUNGS:  Normal respiratory effort with symmetrical lung expansion. Breath sounds clear. HEART:  There is a regular rate and rhythm. No murmur, rub, or gallop. There is no edema in the lower extremities. ABDOMEN:  Soft and non-tender. No hepatosplenomegaly. Bowel sounds are normal.    NEURO:  The patient is alert and oriented to person, place, and time. Memory appears intact and mood is normal.  No gross sensorimotor deficits are present. DIAGNOSTIC TESTS:   Imaging:         Spirometry/Complete pulmonary function tests:     12/4/2017--             Labs:   No results found for: NA, K, CL, CO2, BUN, CREATININE, GLUCOSE, CALCIUM    No results found for: WBC, HGB, HCT, MCV, PLT   No results found for: BNP   No results found for: TSHFT4, TSH           Seble Crespo NP, APRN - CNP  Electronically signed    Dictated using voice recognition software.   Proof read but unrecognized errors may exist.

## 2022-08-02 ENCOUNTER — OFFICE VISIT (OUTPATIENT)
Dept: PULMONOLOGY | Age: 72
End: 2022-08-02
Payer: MEDICARE

## 2022-08-02 VITALS
HEIGHT: 61 IN | RESPIRATION RATE: 17 BRPM | OXYGEN SATURATION: 94 % | WEIGHT: 201.2 LBS | DIASTOLIC BLOOD PRESSURE: 71 MMHG | BODY MASS INDEX: 37.99 KG/M2 | HEART RATE: 53 BPM | TEMPERATURE: 97.9 F | SYSTOLIC BLOOD PRESSURE: 139 MMHG

## 2022-08-02 DIAGNOSIS — J47.9 BRONCHIECTASIS WITHOUT COMPLICATION (HCC): ICD-10-CM

## 2022-08-02 DIAGNOSIS — J45.40 MODERATE PERSISTENT ASTHMA WITHOUT COMPLICATION: Primary | ICD-10-CM

## 2022-08-02 DIAGNOSIS — G47.33 OBSTRUCTIVE SLEEP APNEA: ICD-10-CM

## 2022-08-02 DIAGNOSIS — R09.02 HYPOXIA: ICD-10-CM

## 2022-08-02 DIAGNOSIS — I27.20 PULMONARY HYPERTENSION (HCC): ICD-10-CM

## 2022-08-02 PROCEDURE — G8400 PT W/DXA NO RESULTS DOC: HCPCS | Performed by: NURSE PRACTITIONER

## 2022-08-02 PROCEDURE — 1090F PRES/ABSN URINE INCON ASSESS: CPT | Performed by: NURSE PRACTITIONER

## 2022-08-02 PROCEDURE — G8417 CALC BMI ABV UP PARAM F/U: HCPCS | Performed by: NURSE PRACTITIONER

## 2022-08-02 PROCEDURE — 1036F TOBACCO NON-USER: CPT | Performed by: NURSE PRACTITIONER

## 2022-08-02 PROCEDURE — 1123F ACP DISCUSS/DSCN MKR DOCD: CPT | Performed by: NURSE PRACTITIONER

## 2022-08-02 PROCEDURE — G8427 DOCREV CUR MEDS BY ELIG CLIN: HCPCS | Performed by: NURSE PRACTITIONER

## 2022-08-02 PROCEDURE — 3017F COLORECTAL CA SCREEN DOC REV: CPT | Performed by: NURSE PRACTITIONER

## 2022-08-02 PROCEDURE — 99214 OFFICE O/P EST MOD 30 MIN: CPT | Performed by: NURSE PRACTITIONER

## 2022-08-02 RX ORDER — LANOLIN ALCOHOL/MO/W.PET/CERES
1000 CREAM (GRAM) TOPICAL DAILY
COMMUNITY

## 2022-08-02 ASSESSMENT — ENCOUNTER SYMPTOMS
VOMITING: 0
NAUSEA: 0
ABDOMINAL PAIN: 0
DIARRHEA: 0
CONSTIPATION: 0

## 2022-08-23 ENCOUNTER — OFFICE VISIT (OUTPATIENT)
Dept: CARDIOLOGY CLINIC | Age: 72
End: 2022-08-23
Payer: MEDICARE

## 2022-08-23 VITALS
HEART RATE: 56 BPM | DIASTOLIC BLOOD PRESSURE: 68 MMHG | BODY MASS INDEX: 37.65 KG/M2 | SYSTOLIC BLOOD PRESSURE: 132 MMHG | WEIGHT: 199.4 LBS | HEIGHT: 61 IN

## 2022-08-23 DIAGNOSIS — I10 PRIMARY HYPERTENSION: Primary | ICD-10-CM

## 2022-08-23 DIAGNOSIS — I25.119 ATHEROSCLEROSIS OF NATIVE CORONARY ARTERY OF NATIVE HEART WITH ANGINA PECTORIS (HCC): ICD-10-CM

## 2022-08-23 DIAGNOSIS — I20.9 ANGINA PECTORIS, UNSPECIFIED (HCC): ICD-10-CM

## 2022-08-23 DIAGNOSIS — I27.20 PULMONARY HYPERTENSION (HCC): ICD-10-CM

## 2022-08-23 PROCEDURE — G8427 DOCREV CUR MEDS BY ELIG CLIN: HCPCS | Performed by: INTERNAL MEDICINE

## 2022-08-23 PROCEDURE — 1036F TOBACCO NON-USER: CPT | Performed by: INTERNAL MEDICINE

## 2022-08-23 PROCEDURE — 3017F COLORECTAL CA SCREEN DOC REV: CPT | Performed by: INTERNAL MEDICINE

## 2022-08-23 PROCEDURE — 99214 OFFICE O/P EST MOD 30 MIN: CPT | Performed by: INTERNAL MEDICINE

## 2022-08-23 PROCEDURE — 1090F PRES/ABSN URINE INCON ASSESS: CPT | Performed by: INTERNAL MEDICINE

## 2022-08-23 PROCEDURE — G8417 CALC BMI ABV UP PARAM F/U: HCPCS | Performed by: INTERNAL MEDICINE

## 2022-08-23 PROCEDURE — 1123F ACP DISCUSS/DSCN MKR DOCD: CPT | Performed by: INTERNAL MEDICINE

## 2022-08-23 PROCEDURE — G8400 PT W/DXA NO RESULTS DOC: HCPCS | Performed by: INTERNAL MEDICINE

## 2022-08-23 RX ORDER — NITROGLYCERIN 0.4 MG/1
TABLET SUBLINGUAL
Qty: 25 TABLET | Refills: 3 | Status: SHIPPED | OUTPATIENT
Start: 2022-08-23

## 2022-08-23 RX ORDER — LABETALOL 200 MG/1
200 TABLET, FILM COATED ORAL 2 TIMES DAILY
Qty: 180 TABLET | Refills: 3 | Status: SHIPPED | OUTPATIENT
Start: 2022-08-23

## 2022-08-23 NOTE — PROGRESS NOTES
[facility] CARDIOLOGY  [address], [address] [address] [place] [address]  PHONE: [phone]        22        NAME:  Gabi Clark  : 1950  MRN: [id number]     CHIEF COMPLAINT:    Hypertension         SUBJECTIVE:       No chest pain or palpitation or dizziness. Medications were all reviewed with the patient today and updated as necessary. Current Outpatient Medications   Medication Sig    nitroGLYCERIN (NITROSTAT) 0.4 MG SL tablet prn    labetalol (NORMODYNE) 200 MG tablet Take 1 tablet by mouth 2 times daily TAKE ONE TABLET BY MOUTH TWICE A DAY    OXYGEN Inhale into the lungs 3LPM qhs w/ Bipap    BiPAP Machine MISC by Does not apply route 2/ 3LPM or O2    vitamin B-12 (CYANOCOBALAMIN) 1000 MCG tablet Take 1,000 mcg by mouth in the morning.     acetaminophen (TYLENOL) 325 mg tablet Take 650 mg by mouth every 6 hours as needed for Pain    empagliflozin (JARDIANCE) 10 MG tablet Take 10 mg by mouth daily    albuterol (PROVENTIL) (2.5 MG/3ML) 0.083% nebulizer solution Inhale 2.5 mg into the lungs every 4 hours as needed    aspirin 81 MG EC tablet Take by mouth every evening     atorvastatin (LIPITOR) 80 MG tablet Take 80 mg by mouth every evening     balsalazide (COLAZAL) 750 MG capsule Take 1,500 mg by mouth 2 times daily     benazepril (LOTENSIN) 40 MG tablet Take 40 mg by mouth daily    chlorthalidone (HYGROTON) 25 MG tablet TAKE ONE TABLET BY MOUTH ONE TIME DAILY    cloNIDine (CATAPRES) 0.1 MG tablet Take 0.1 mg by mouth 2 times daily    diclofenac sodium (VOLTAREN) 1 % GEL Apply topically 4 times daily    DULoxetine (CYMBALTA) 60 MG extended release capsule Take 30 mg by mouth 3 times daily    esomeprazole (NEXIUM) 40 MG delayed release capsule Take 40 mg by mouth daily    fluticasone (FLONASE) 50 MCG/ACT nasal spray 2 sprays by Nasal route daily    metFORMIN, OSM, (FORTAMET) 1000 MG extended release tablet Take 850 mg by mouth 2 times daily    mometasone-formoterol (DULERA) 100-5 MCG/ACT inhaler Inhale 2 puffs into the lungs 2 times daily    montelukast (SINGULAIR) 10 MG tablet Take 10 mg by mouth    polyethylene glycol (GLYCOLAX) 17 GM/SCOOP powder Take 17 g by mouth daily as needed     No current facility-administered medications for this visit. Allergies   Allergen Reactions    Latex Rash    Lidocaine Anaphylaxis     Allergic to all \"KERRIE\"    Propofol Other (See Comments)     Paralysis    Penicillins Other (See Comments)    Ceftriaxone Rash    Cephalosporins Rash    Morphine Rash    Pregabalin Rash           PHYSICAL EXAM:     Wt Readings from Last 3 Encounters:   08/23/22 199 lb 6.4 oz (90.4 kg)   08/02/22 201 lb 3.2 oz (91.3 kg)   06/02/22 202 lb (91.6 kg)     BP Readings from Last 3 Encounters:   08/23/22 132/68   08/02/22 139/71   06/02/22 (!) 158/68       /68   Pulse 56   Ht 5' 1\" (1.549 m)   Wt 199 lb 6.4 oz (90.4 kg)   BMI 37.68 kg/m²     Physical Exam  Vitals reviewed. HENT:      Head: Normocephalic and atraumatic. Eyes:      Extraocular Movements: Extraocular movements intact. Pupils: Pupils are equal, round, and reactive to light. Cardiovascular:      Rate and Rhythm: Normal rate. Heart sounds: Normal heart sounds. Pulmonary:      Effort: Pulmonary effort is normal.      Breath sounds: Normal breath sounds. Abdominal:      General: Abdomen is flat. Palpations: Abdomen is soft. There is no mass. Musculoskeletal:         General: Normal range of motion. Cervical back: Normal range of motion. Skin:     General: Skin is warm and dry. Neurological:      General: No focal deficit present. Mental Status: She is alert and oriented to person, place, and time. Psychiatric:         Mood and Affect: Mood normal.         RECENT LABS AND RECORDS REVIEW    In June: Creat 1.4, a1c 6.5      ASSESSMENT and PLAN    Nishi Salcido was seen today for hypertension. Diagnoses and all orders for this visit:    1.  Crohn's disease of small intestine without complication ([address]        stable     2. Essential hypertension    Controlled but home device not reliable       3. Atherosclerosis of native coronary artery of native heart without angina pectoris      No angina     4. Mild intermittent asthma without complication      stable       5. Type 2 diabetes mellitus without complication (HCC)        BS's gen. Ok     6. Obstructive sleep apnea        wears cpap     7. ILD (interstitial lung disease) ([facility])      Wears 3 liters O2 q hs       8. Bronchiectasis without complication ([facility])             9. Gastroesophageal reflux disease with esophagitis           10. Other chronic pain           11. Other depression          Return in about 6 months (around 2/23/2023).        Maximiliano Jo MD  8/23/2022  1:23 PM

## 2022-09-28 DIAGNOSIS — I10 HTN (HYPERTENSION): ICD-10-CM

## 2022-09-28 DIAGNOSIS — I27.20 PULMONARY HYPERTENSION (HCC): Primary | ICD-10-CM

## 2022-09-28 RX ORDER — BENAZEPRIL HYDROCHLORIDE 40 MG/1
TABLET, FILM COATED ORAL
Qty: 90 TABLET | Refills: 3 | Status: SHIPPED | OUTPATIENT
Start: 2022-09-28

## 2022-09-28 NOTE — TELEPHONE ENCOUNTER
Requested Prescriptions     Pending Prescriptions Disp Refills    benazepril (LOTENSIN) 40 MG tablet [Pharmacy Med Name: BENAZEPRIL 40 MG TAB[*]] 90 tablet 3     Sig: TAKE ONE TABLET BY MOUTH ONE TIME DAILY

## 2022-09-30 DIAGNOSIS — I10 HTN (HYPERTENSION): ICD-10-CM

## 2022-10-03 RX ORDER — BENAZEPRIL HYDROCHLORIDE 40 MG/1
TABLET, FILM COATED ORAL
Qty: 90 TABLET | Refills: 3 | OUTPATIENT
Start: 2022-10-03

## 2022-11-22 NOTE — TELEPHONE ENCOUNTER
Patient needs the prescription for      Disp Refills Start End    mometasone-formoterol (DULERA) 100-5 MCG/ACT inhaler   11/30/2021     Sig - Route: Inhale 2 puffs into the lungs 2 times daily - Inhalation        Sent to [facility] in [facility]

## 2023-01-11 NOTE — PROGRESS NOTES
[address] [place] [address] Court, [address] [facility]  [phone]    Patient Name:  Angela Hunter  YOB: 1950      Office Visit 1/12/2023    CHIEF COMPLAINT:      Chief Complaint   Patient presents with    Sleep Apnea    Follow-up    CPAP/BiPAP         HISTORY OF PRESENT ILLNESS:        Pt is a 68 yo  female with a history of NEFTALI. Pt had a PSG/HST on 4/1/03 with an AHI of 19.8 with desaturations to 78%. Pt is on BiPAP 14/9 cm H2O with 3L bled in. Pt is seen today for follow up for sleep apnea and insomnia. She is using and benefiting from her BiPAP on a nightly basis and her download indicated 99% compliance. Her average daily use is 8 hours and 25 minutes. Her AHI is well-controlled at 0.9/hour. He has substantial amount of leak with the median leak is 8.7 L/min and 95th percentile leak is 70.5 L/min. The patient stated that the leak does not bother her whatsoever. Her machine is broken beyond repair and needs replacement. She wears a full face mask and denies any issues with her machine or mask. She reports that her energy levels are pretty good. She does get tired but does attribute this to her age and other medical conditions. The [place] score today is 7 out of 24. She continues have difficulty initiating and maintaining sleep. She has been trying melatonin with some good results but not optimal.  I suggested to her that we can try low-dose doxepin to help with her insomnia. If this is not successful we will try different agent such as Rozerem or Belsomra. We will try to avoid nonbenzodiazepine antagonist such as Ambien or Lunesta.     Sleep Medicine 1/12/2023   Sitting and reading 2   Watching TV 2   Sitting, inactive in a public place (e.g. a theatre or a meeting) 0   As a passenger in a car for an hour without a break 0   Lying down to rest in the afternoon when circumstances permit 3   Sitting and talking to someone 0   Sitting quietly after a lunch without alcohol 0   In a car, while stopped for a few minutes in traffic 0   [place] Sleepiness Score 7         Past Medical History:   Diagnosis Date    Arthritis     tylenol arthritis    Asthma     daily inhler; used rescue inhaler 1 month ago (Nov 2018)- last used neb 1 week ago    CAD (coronary artery disease)     MI- 3/2016- had 1 stent    Chronic obstructive pulmonary disease ([address]     pt denies    Chronic pain     Crohn's disease ([address]     Diabetes mellitus type 2, controlled ([address]     type 2; avg fasting glucose- 100; last A1C= 6.0    GERD (gastroesophageal reflux disease)     controlled with esomeprazole    History of blood transfusion     s a child     History of pneumonia 04/2013    Pneumonia/asthma exacerbation, was on vent and transferred to [facility] for weaning; D/C home on oxygen.     HTN (hypertension)     controlled with med    Hypercholesteremia     statin taken at night    Morbid obesity ([address]     Psychiatric disorder     depression    Pulmonary fibrosis ([address]     Sleep apnea     uses BIPAP w 02 @ 3 l/min at HS         Patient Active Problem List   Diagnosis    Asthma    Chronic pain    Esophageal reflux    Long term (current) use of antithrombotics/antiplatelets    Recurrent depression ([address]    Coronary atherosclerosis of native coronary artery    Type 2 diabetes mellitus without complication ([address]    Obstructive sleep apnea    Nocturnal hypoxemia    Depression    Diabetes ([address]    Debility    HTN (hypertension)    Crohn's disease ([address]    Pain of left lower extremity    Pulmonary hypertension (HCC)    Angina pectoris, unspecified    Atherosclerotic heart disease of native coronary artery with unspecified angina pectoris          Past Surgical History:   Procedure Laterality Date    CARDIAC CATHETERIZATION  03/01/2016    stent x1    COLONOSCOPY      COLONOSCOPY N/A 6/2/2022    COLONOSCOPY POLYPECTOMY AND COLD BIOPSY performed by Elana Gerardo MD at [facility] ENDOSCOPY    ENDOSCOPY, COLON, DIAGNOSTIC HEENT      Sinus sx    NEUROLOGICAL SURGERY  12/04/2018    ENMA X 1    UPPER GASTROINTESTINAL ENDOSCOPY N/A 6/2/2022    EGD BIOPSY performed by Danny Chavez MD at [facility] ENDOSCOPY       [unfilled]        Social History     Socioeconomic History    Marital status:      Spouse name: Not on file    Number of children: Not on file    Years of education: Not on file    Highest education level: Not on file   Occupational History    Not on file   Tobacco Use    Smoking status: Never    Smokeless tobacco: Never   Vaping Use    Vaping Use: Never used   Substance and Sexual Activity    Alcohol use: No     Alcohol/week: 0.0 standard drinks    Drug use: No    Sexual activity: Not on file   Other Topics Concern    Not on file   Social History Narrative    , lives with . She works as a  for the [employer]. She is a lifelong nonsmoker/drinker.      Social Determinants of Health     Financial Resource Strain: Not on file   Food Insecurity: Not on file   Transportation Needs: Not on file   Physical Activity: Not on file   Stress: Not on file   Social Connections: Not on file   Intimate Partner Violence: Not on file   Housing Stability: Not on file         Family History   Problem Relation Age of Onset    Heart Disease Mother     Liver Disease Father         alcohol related    Diabetes Sister          Allergies   Allergen Reactions    Latex Rash    Lidocaine Anaphylaxis     Allergic to all \"KERRIE\"    Propofol Other (See Comments)     Paralysis    Penicillins Other (See Comments)    Ceftriaxone Rash    Cephalosporins Rash    Morphine Rash    Pregabalin Rash         Current Outpatient Medications   Medication Sig    alendronate (FOSAMAX) 70 MG tablet     OYSCO 500 + D 500-5 MG-MCG TABS     calcium-vitamin D (OSCAL 500 D-3) 500-5 MG-MCG TABS per tablet Take 1 tablet by mouth    DULoxetine (CYMBALTA) 30 MG extended release capsule     famotidine (PEPCID) 40 MG tablet mometasone-formoterol (DULERA) 100-5 MCG/ACT inhaler Inhale 2 puffs into the lungs 2 times daily    benazepril (LOTENSIN) 40 MG tablet TAKE ONE TABLET BY MOUTH ONE TIME DAILY    nitroGLYCERIN (NITROSTAT) 0.4 MG SL tablet prn    labetalol (NORMODYNE) 200 MG tablet Take 1 tablet by mouth 2 times daily TAKE ONE TABLET BY MOUTH TWICE A DAY    OXYGEN Inhale into the lungs 3LPM qhs w/ Bipap    BiPAP Machine MISC by Does not apply route 2/ 3LPM or O2    vitamin B-12 (CYANOCOBALAMIN) 1000 MCG tablet Take 1,000 mcg by mouth in the morning. acetaminophen (TYLENOL) 325 mg tablet Take 650 mg by mouth every 6 hours as needed for Pain    empagliflozin (JARDIANCE) 10 MG tablet Take 10 mg by mouth daily    albuterol (PROVENTIL) (2.5 MG/3ML) 0.083% nebulizer solution Inhale 2.5 mg into the lungs every 4 hours as needed    aspirin 81 MG EC tablet Take by mouth every evening     atorvastatin (LIPITOR) 80 MG tablet Take 80 mg by mouth every evening     balsalazide (COLAZAL) 750 MG capsule Take 1,500 mg by mouth 2 times daily     chlorthalidone (HYGROTON) 25 MG tablet TAKE ONE TABLET BY MOUTH ONE TIME DAILY    cloNIDine (CATAPRES) 0.1 MG tablet Take 0.1 mg by mouth 2 times daily    diclofenac sodium (VOLTAREN) 1 % GEL Apply topically 4 times daily    DULoxetine (CYMBALTA) 60 MG extended release capsule Take 30 mg by mouth 3 times daily    esomeprazole (NEXIUM) 40 MG delayed release capsule Take 40 mg by mouth daily    fluticasone (FLONASE) 50 MCG/ACT nasal spray 2 sprays by Nasal route daily    metFORMIN, OSM, (FORTAMET) 1000 MG extended release tablet Take 850 mg by mouth 2 times daily    montelukast (SINGULAIR) 10 MG tablet Take 10 mg by mouth    polyethylene glycol (GLYCOLAX) 17 GM/SCOOP powder Take 17 g by mouth daily as needed    doxepin (SINEQUAN) 10 MG capsule Take 1 capsule by mouth nightly     No current facility-administered medications for this visit.            REVIEW OF SYSTEMS:   CONSTITUTIONAL:   There is no history of fever, chills, night sweats, weight loss, weight gain, persistent fatigue, or lethargy/hypersomnolence.   CARDIAC:   No chest pain, pressure, discomfort, palpitations, orthopnea, murmurs, or edema.   GI:   No dysphagia, heartburn reflux, nausea/vomiting, diarrhea, abdominal pain, or bleeding.   NEURO:   There is no history of AMS, persistent headache, decreased level of consciousness, seizures, or motor or sensory deficits.      PHYSICAL EXAM:    Vitals:    01/12/23 1422   BP: (!) 142/66   Pulse: 61   Resp: 14   Temp: 97 °F (36.1 °C)   SpO2: 90%        GENERAL APPEARANCE:   The patient is normal weight and in no respiratory distress.   HEENT:   PERRL.  Conjunctivae unremarkable.   Nasal mucosa is without epistaxis, exudate, or polyps.  Gums and dentition are unremarkable.  There is moderate oropharyngeal narrowing.     NECK/LYMPHATIC:   Symmetrical with no elevation of jugular venous pulsation.  Trachea midline. No thyroid enlargement.  No cervical adenopathy.   LUNGS:   Normal respiratory effort with symmetrical lung expansion.   Breath sounds are diminished in the bases.   HEART:   There is a regular rate and rhythm.  No murmur, rub, or gallop.  There is 1+ edema in the lower extremities.   ABDOMEN:   Soft and non-tender.  No hepatosplenomegaly.  Bowel sounds are normal.     NEURO:   The patient is alert and oriented to person, place, and time.  Memory appears intact and mood is normal.  No gross sensorimotor deficits are present.          ASSESSMENT:  (Medical Decision Making)      Diagnosis Orders   1. Obstructive sleep apnea this is being treated with BiPAP at 14/9 cm and has demonstrated excellent compliance.  She will need replacement machine at this point and this will be ordered at the same setting.  She will need another mask fitting done DME - DURABLE MEDICAL EQUIPMENT      2. Severe obesity (BMI 35.0-39.9) with comorbidity (HCC), contributing to complexity of care       3. Pulmonary hypertension (HCC),  related to her sleep apnea and chronic lung disease. DME - DURABLE MEDICAL EQUIPMENT      4. Nocturnal hypoxemia, she is currently on oxygen 3 L/min added to her BiPAP DME - DURABLE MEDICAL EQUIPMENT           PLAN:    Replacement BiPAP machine with the same setting of 14/9 cm will be ordered along with oxygen at 3 L/min    She will continue proper sleep hygiene and positional therapy    We will renew her supplies order    She will continue taking melatonin nightly    She will start taking low-dose doxepin at 10 mg nightly to help her insomnia    She will maintain her follow-up in pulmonary and primary care provider    She will return to the sleep center in 4 months or sooner if needed to check her compliance      Orders Placed This Encounter   Procedures    DME - [facility]  Phone: [address] [address]  Dept: [phone]      Patient Name: Zaida Aleman  : 1950  Gender: female  Address: [place] [address]  Patient phone number: [phone] (home)       Primary Insurance: Payor: MEDICARE / Plan: MEDICARE PART A AND B / Product Type: *No Product type* /   Subscriber ID: [id number] - (Medicare)      AMB Supply Order  Order Details     DME Location:     Order Date: 2023   The primary encounter diagnosis was Obstructive sleep apnea. Diagnoses of Severe obesity (BMI 35.0-39. 9) with comorbidity ([address] Pulmonary hypertension ([address] and Nocturnal hypoxemia were also pertinent to this visit.           (  X   )New Set-Up       machine   (     )[id number] CPAP Unit  (     ) Auto CPAP Unit  (  x   ) BiLevel Unit  (     ) Auto BiLevel Unit  (     ) ASV   (     ) Bilevel ST    (     ) Oxygen Concentrator         Length of need: 12 months    Pressure: 14/9  cmH20 and add oxygen at 3 L/min to BiPAP  EPR:       Starting Ramp Pressure:    cm H20  Ramp Time: min       Patient had a diagnostic Apnea Hypopnea Index (AHI) of :       *SUPPLIES* Replace all as needed, or per coverage guidelines     Masks Type:    (  x   ) -Full Face Mask (1 per 3 mon)  ( x    ) -Full Mask (1 per month) Interface/Cushion      (     ) -Nasal Mask (1 per 3 mon)  (     ) - Nasal Mask (1 per month) Interface/Cushion  (     ) -Pillow (2 per mon)  (     ) -Foslvpuiq (1 per 6 mon)      _________________________________________________________________          Other Supplies:    (  X   )-Gclxugcj (1 per 6 mon)  ( X    )-Fzqpey Tubing (1 per 3 mon)  (  X   )- Disposable Filter (2 per mon)  (   X  )-Fiymxw Humidifier (1 per year)     (  x   )-Qcjzquhmw (sometimes used with Full Face Mask) (1 per 6 mos)  (     )-Tubing-without heat (1 per 3 mos)  ( X   )-Non-Disposable Filter (1 per 6 mos)  (   x  )-Water Chamber (1 per 6 mos)  (     )-Humidifier non-heated (1 per 5 yrs)      Signed Date: 1/12/2023  Electronically Signed By: Estrella Herrera MD        Orders Placed This Encounter   Medications    doxepin (SINEQUAN) 10 MG capsule     Sig: Take 1 capsule by mouth nightly     Dispense:  30 capsule     Refill:  3          Over 50% of today's office visit was spent in face to face time reviewing test results, prognosis, importance of compliance, education about disease process, benefits of medications, instructions for management of acute flare-ups, and follow up plans. Total face to face time spent with patient including charting was 35 minutes.         Estrella Herrera MD  Electronically signed

## 2023-01-12 ENCOUNTER — OFFICE VISIT (OUTPATIENT)
Dept: SLEEP MEDICINE | Age: 73
End: 2023-01-12
Payer: MEDICARE

## 2023-01-12 VITALS
OXYGEN SATURATION: 90 % | DIASTOLIC BLOOD PRESSURE: 66 MMHG | WEIGHT: 206 LBS | HEIGHT: 61 IN | SYSTOLIC BLOOD PRESSURE: 142 MMHG | RESPIRATION RATE: 14 BRPM | BODY MASS INDEX: 38.89 KG/M2 | TEMPERATURE: 97 F | HEART RATE: 61 BPM

## 2023-01-12 DIAGNOSIS — G47.34 NOCTURNAL HYPOXEMIA: ICD-10-CM

## 2023-01-12 DIAGNOSIS — E66.01 SEVERE OBESITY (BMI 35.0-39.9) WITH COMORBIDITY (HCC): ICD-10-CM

## 2023-01-12 DIAGNOSIS — I27.20 PULMONARY HYPERTENSION (HCC): ICD-10-CM

## 2023-01-12 DIAGNOSIS — G47.33 OBSTRUCTIVE SLEEP APNEA: Primary | ICD-10-CM

## 2023-01-12 PROCEDURE — 3077F SYST BP >= 140 MM HG: CPT | Performed by: INTERNAL MEDICINE

## 2023-01-12 PROCEDURE — G8484 FLU IMMUNIZE NO ADMIN: HCPCS | Performed by: INTERNAL MEDICINE

## 2023-01-12 PROCEDURE — 1036F TOBACCO NON-USER: CPT | Performed by: INTERNAL MEDICINE

## 2023-01-12 PROCEDURE — 1090F PRES/ABSN URINE INCON ASSESS: CPT | Performed by: INTERNAL MEDICINE

## 2023-01-12 PROCEDURE — 99214 OFFICE O/P EST MOD 30 MIN: CPT | Performed by: INTERNAL MEDICINE

## 2023-01-12 PROCEDURE — 1123F ACP DISCUSS/DSCN MKR DOCD: CPT | Performed by: INTERNAL MEDICINE

## 2023-01-12 PROCEDURE — G8417 CALC BMI ABV UP PARAM F/U: HCPCS | Performed by: INTERNAL MEDICINE

## 2023-01-12 PROCEDURE — G8400 PT W/DXA NO RESULTS DOC: HCPCS | Performed by: INTERNAL MEDICINE

## 2023-01-12 PROCEDURE — G8427 DOCREV CUR MEDS BY ELIG CLIN: HCPCS | Performed by: INTERNAL MEDICINE

## 2023-01-12 PROCEDURE — 3078F DIAST BP <80 MM HG: CPT | Performed by: INTERNAL MEDICINE

## 2023-01-12 PROCEDURE — 3017F COLORECTAL CA SCREEN DOC REV: CPT | Performed by: INTERNAL MEDICINE

## 2023-01-12 RX ORDER — DULOXETIN HYDROCHLORIDE 30 MG/1
CAPSULE, DELAYED RELEASE ORAL
COMMUNITY
Start: 2022-11-10

## 2023-01-12 RX ORDER — DOXEPIN HYDROCHLORIDE 10 MG/1
10 CAPSULE ORAL NIGHTLY
Qty: 30 CAPSULE | Refills: 3 | Status: SHIPPED | OUTPATIENT
Start: 2023-01-12

## 2023-01-12 RX ORDER — CALCIUM CARBONATE/VITAMIN D3 500MG-5MCG
TABLET ORAL
COMMUNITY
Start: 2022-10-26

## 2023-01-12 RX ORDER — FAMOTIDINE 40 MG/1
TABLET, FILM COATED ORAL
COMMUNITY
Start: 2022-11-23

## 2023-01-12 RX ORDER — ALENDRONATE SODIUM 70 MG/1
TABLET ORAL
COMMUNITY
Start: 2022-10-26

## 2023-01-12 ASSESSMENT — SLEEP AND FATIGUE QUESTIONNAIRES
HOW LIKELY ARE YOU TO NOD OFF OR FALL ASLEEP WHILE SITTING QUIETLY AFTER LUNCH WITHOUT ALCOHOL: 0
ESS TOTAL SCORE: 7
HOW LIKELY ARE YOU TO NOD OFF OR FALL ASLEEP WHILE SITTING AND TALKING TO SOMEONE: 0
HOW LIKELY ARE YOU TO NOD OFF OR FALL ASLEEP WHILE LYING DOWN TO REST IN THE AFTERNOON WHEN CIRCUMSTANCES PERMIT: 3
HOW LIKELY ARE YOU TO NOD OFF OR FALL ASLEEP IN A CAR, WHILE STOPPED FOR A FEW MINUTES IN TRAFFIC: 0
HOW LIKELY ARE YOU TO NOD OFF OR FALL ASLEEP WHILE SITTING INACTIVE IN A PUBLIC PLACE: 0
HOW LIKELY ARE YOU TO NOD OFF OR FALL ASLEEP WHILE WATCHING TV: 2
HOW LIKELY ARE YOU TO NOD OFF OR FALL ASLEEP WHEN YOU ARE A PASSENGER IN A CAR FOR AN HOUR WITHOUT A BREAK: 0
HOW LIKELY ARE YOU TO NOD OFF OR FALL ASLEEP WHILE SITTING AND READING: 2

## 2023-01-12 NOTE — PATIENT INSTRUCTIONS
Sleep Hygiene Instructions    Sleep only as much as you need to feel refreshed during the following day.  Restricting your time in bed helps to consolidate and deepen your sleep.  Excessively long times in bed lead to fragmented and shallow sleep.  Get up at your regular time the next day, no matter how little your slept.  Get up at the same time each day, 7 days a week.  A regular wake time in the morning leads to regular times on sleep onset, and helps to set your biological clock.  Exercise regularly.  Schedule exercise times so that they do not occur within 3 hours of when you intend to go to bed.  Exercise makes it easier to initiate sleep and deepen sleep.  Don't take your problems to bed.  Plan some time earlier in the evening for working on your problems or planning the next day's activities.  Worrying may interfere with initiating sleep and produce shallow sleep.   Train yourself to use the bedroom only for sleep and sexual activity.  This will help condition your brain to see bed as the place for sleeping.  Do not read, watch TV or eat in bed.  Do not try and fall asleep. This only makes the problem worse.  Instead, turn on the light, leave the bedroom, and do something different like reading a book.  Don't engage in stimulating activity. Return to bed only when you feel sleepy.  Avoid long naps. Staying awake during the day helps to fall asleep at night.  Naps totalling more than 30 minutes increase your chances of having trouble sleeping at night.  Make sure that your bedroom is comfortable and free from light and noise. A comfortable, noise-free sleep environment will reduce the likelihood that you will wake up during the night.  Noise that does not awaken you may disturb the quality of your sleep.  Carpeting, insulated curtains, and closing the door may help.  Make sure that your bedroom is at a comfortable temperature during the night. Excessively warm or cold sleep environments may disturb  sleep. Eat regular meals and di not go to bed hungry. Hunger may disturb sleep. A light snack at bedtime (especially carbohydrates) may help sleep, but avoid greasy or heavy foods. Avoid excessive liquids in the evening. Reducing liquid intake will minimize the need for night-time trips to the bathroom. Cut down on all caffeine products. Caffeinated beverages and food (Coffee, tea, cola, chocolate) can cause difficulty falling asleep, awakenings during the night, and shallow sleep. Even caffeine early in the day can disrupt night-time sleep. Avoid alcohol, especially in the evening. Although alcohol helps tense people fall asleep more easily, it causes awakenings later in the night. Smoking may disturb sleep. Nicotine is a stimulant. Try not to smoke during the night when you have trouble sleeping. Learning about Sleeping Well    What does sleeping well mean? Sleeping well means getting enough sleep. How much sleep is enough varies among people. The number of hours you sleep is not as improtant as how you feel when you wake up. If you do not feel refreshed, you probably need more sleep. Another sign of not getting enough sleep is feeling tired during the day. The average totally nightly sleep time is 7 1/2 to 8 hours. Healthy adults may need a little more or a little less than this. Why is getting enough sleep important? Getting enough quality sleep is a basic part of good health. When your sleep suffers, your mood and your thoughts can suffer too. Your thoughts can suffer too. You ma find yourself feeling more grumpy or stressed. No getting enough sleep also can lead to serious problems, including injury, accidents, anxiety, and depression. What might cause poor sleeping? Many things can cause sleep problems, including:    Stress. Stress can be caused by fear about a single event, such as giving a speech.   Or you may have ongoing stress, such as worry about work or school. Depression, anxiety, and other mental or emotional conditions. Changes in your sleep habits or surroundings. This includes changes that happen where you sleep, such as noise, light, or sleeping in a different bed. It also includes changes in your sleep pattern, such as having jet lab or working a late shift. Health problems, such as pain, breathing problems, and restless legs syndrome. Lack of regular exercise. How can you help yourself? Here are some tips that may help you sleep more soundly and wake up feeling more refreshed. Your sleeping area    Use your bedroom only for sleeping and sex. A bit of light reading may help you fall asleep. But if it doesn't, do your reading elsewhere in the house. Don't watch TV in bed. Be sure your bed is big enough to stretch out comfortable, especially if you have a sleep partner. Keep your bedroom quiet, dark, and cool. Use curtains, blinds, or sleep mask to block out light. To block out noise, use earplugs, soothing music, or a \"white noise\" machine. Your evening and bedtime routine    Create a relaxing bedtime routine. You might want to take a warm shower or bath, listen to soothing music, or drink a cup of non-caffeinated tea. Go to bed at the same time every night. And get up at the same time every morning, even if you feel tired. What to avoid:    Limit caffeine (coffee, tea, caffeinated sodas) during the day, and dont have any for at least 4 to 6 hours before bedtime. Don't drink alcohol before bedtime. Alcohol can cause you to wake up more often during the night. Don't smoke or use tobacco, especially in the evening. Nicotine can keep you awake. Don't like in bed away for too long. If you can't fall asleep, or if you wake up in the middle of the night and can't get back to sleep within 15 minutes or so, get out of bed and go to another room until you feel sleepy.     Don't take medicine right before bed that may keep you awake or make you feel hyper or enegerized. Your doctor can tell you if your medicine may do this and if you can take it earlier in the day. If you can't sleep:    Imagine yourself in a peaceful, pleasant scene. Focus on the details and feelings of being in a place that is relaxing. Get up and do a quiet or boring activity until you feel sleepy. Don't drink liquids after 6 p.m. if you wake up often because you have to go to the bathroom. Where can you learn more? Go to http://www.gray.com/    Enter Z496 in the search box to learn more about \"Learning About Sleeping Well. \"           The company who will be taking care of your  supplies is:       Address: [address]  Phone: [phone]  Fax: [phone]

## 2023-02-05 NOTE — PROGRESS NOTES
Name:  Suresh James  YOB: 1950   MRN: [id number]      Office Visit: 2023        ASSESSMENT AND PLAN:  (Medical Decision Making)    Impression:     1. Moderate persistent asthma without complication  --remains on Dulera twice daily and Singulair qhs.      2. Pulmonary hypertension (HCC)  --RVSP 45-50 mm HG on echo at [facility] in . Follow up is needed. If this remains abnormal and her pending YARON is normal, will need further work up. - Ambulatory referral to Cardiology    3. Hypoxia  --nocturnal.  Recently seen in sleep clinic by Dr. Nhan Marrero. YARON is pending on BiPAP and O2 at 3 lpm.    4. Obstructive sleep apnea (adult) (pediatric)  --remains on BiPAP--followed in sleep clinic--see above. Orders Placed This Encounter   Medications    fluticasone (FLONASE) 50 MCG/ACT nasal spray     Si sprays by Nasal route daily     Dispense:  1 each     Refill:  11    albuterol (PROVENTIL) (2.5 MG/3ML) 0.083% nebulizer solution     Sig: Take 3 mLs by nebulization every 4 hours as needed for Wheezing Asthma DX j45.9     Dispense:  120 each     Refill:  11     No orders of the defined types were placed in this encounter. Follow-up and Dispositions    Return in about 6 months (around 2023) for Cristina, Asthma, spirometry, Arrive 15 minutes prior to appt time. Collaborating physician is Dr. Anup Kern. ADS    Jennifer Goss NP, APRN - CNP    _________________________________________________________________________    HISTORY OF PRESENT ILLNESS:    Ms. Piper Sanon is a 67 y.o. female who is seen at [facility] for  Asthma     The patient is a 67year old female who is seen for follow up of bronchiectasis, asthma, NEFTALI, and nocturnal hypoxemia. Remains on Dulera bid. Denies any cough or wheezing. BETANCOURT has improved. Had a respiratory infection in December, but feels back to baseline now. Denies any reflux symptoms. Allergies have been controlled.      Remains on BiPAP--followed in sleep clinic. She had an echo at [facility] 1/20/22 which revealed right ventricular systolic pressures of 63-44 mm Hg. She is actually off of Lasix currently and has improved LE edema. Follow up echo was ordered at last visit, but this has not been completed. REVIEW OF SYSTEMS: 10 point review of systems is negative except as reported in HPI. PHYSICAL EXAM: Body mass index is 37.48 kg/m². Vitals:    02/06/23 1322   BP: (!) 140/74   Pulse: 58   Resp: 19   Temp: 97.2 °F (36.2 °C)   TempSrc: Skin   SpO2: 95%   Weight: 204 lb 14.4 oz (92.9 kg)   Height: 5' 2\" (1.575 m)         General:   Alert, cooperative, no distress, appears stated age. Eyes:   Conjunctivae/corneas clear. PERRL        Mouth/Throat:  Lips, mucosa, and tongue normal. Teeth and gums normal.        Lungs:     Breath sounds clear. Heart:   Regular rate and rhythm, S1, S2 normal, no murmur, click, rub or gallop. Abdomen:    Soft, non-tender. Extremities:  Extremities normal, atraumatic, no cyanosis or edema. Skin:  Skin color normal. No rashes or lesions     Neurologic:  A&Ox3     DIAGNOSTIC TESTS:                                                                                    LABS: No results found for: WBC, HGB, HCT, PLT, TSH, IGE, NTPROBNP, WILLIAMS, ANCA, RF, ESR, CRP  Imaging: I performed an independent interpretation of the patient's images. CXR:   XR CHEST STANDARD TWO VW 06/06/2018    Narrative  CHEST X-RAY, 2 views 6/6/2018    History: Interstitial lung disease and asthma. Shortness of breath with exertion  today. Technique: PA and lateral views of the chest.    Comparison: Chest x-ray 5/7/2013    Findings: The cardiac silhouette is mild to moderately enlarged although stable. The  lungs are expanded without evidence for pneumothorax. No evolving  consolidation, or evidence of pleural effusion is seen.  Irregular densities at  the periphery of the lung bases appear improved likely representing improving  atelectasis. Stable persistent elevation of the right hemidiaphragm is seen. The bony thorax demonstrates no acute changes. The upper abdomen is  unremarkable in appearance. Impression  IMPRESSION:  1.  Stable cardiomegaly. 2.  Improving patchy basilar airspace changes likely representing improving  atelectasis. CT Chest:   CT CHEST WO CONTRAST 11/11/2020    Narrative  CT of the chest without contrast, high resolution protocol. CLINICAL INDICATION:  Shortness of breath on exertion, pulmonary fibrosis    PROCEDURE: Serial thin section axial images are obtained from the thoracic inlet  through the upper abdomen following a dedicated, high-resolution protocol. Radiation dose reduction techniques were used for this study. Our CT scanners  use one or all of the following: Automated exposure control, adjusted of the mA  and/or kV according to patient size, iterative reconstruction    COMPARISON:  Chest CT dated 5/3/2019  FINDINGS: No mediastinal or axillary adenopathy. No pleural or pericardial  effusion. There is no airspace consolidation. There is no pneumothorax. A stable  5 mm pulmonary nodule is noted at the base of the left lower lobe. High-resolution imaging shows no significant interstitial thickening or  bronchiectasis. No honeycombing evident to indicate pulmonary fibrosis. Limited evaluation of the upper abdomen is unremarkable. No aggressive osseous lesions identified. Impression  IMPRESSION:    1. No acute cardiopulmonary abnormality. 2. No findings present to indicate interstitial lung disease. Specifically,  there is no bronchiectasis or peripheral thickening of the secondary lobule  septa. 3. No honeycombing noted to indicate pulmonary fibrosis. 4. Stable 5 mm pulmonary nodule at the left lung base.         [facility] Reference Info:                                                                                                                  Past Medical History:   Diagnosis Date    Arthritis     tylenol arthritis    Asthma     daily inhler; used rescue inhaler 1 month ago (Nov 2018)- last used neb 1 week ago    CAD (coronary artery disease)     MI- 3/2016- had 1 stent    Chronic obstructive pulmonary disease ([facility]     pt denies    Chronic pain     Crohn's disease ([facility]     Diabetes mellitus type 2, controlled ([facility]     type 2; avg fasting glucose- 100; last A1C= 6.0    GERD (gastroesophageal reflux disease)     controlled with esomeprazole    History of blood transfusion     s a child     History of pneumonia 04/2013    Pneumonia/asthma exacerbation, was on vent and [facility]; D/C home on oxygen. HTN (hypertension)     controlled with med    Hypercholesteremia     statin taken at night    Morbid obesity ([facility]     Psychiatric disorder     depression    Pulmonary fibrosis ([facility]     Sleep apnea     uses BIPAP w 02 @ 3 l/min at HS        Tobacco Use      Smoking status: Never      Smokeless tobacco: Never    Allergies   Allergen Reactions    Latex Rash    Lidocaine Anaphylaxis     Allergic to all \"KERRIE\"    Propofol Other (See Comments)     Paralysis    Penicillins Other (See Comments)    Ceftriaxone Rash    Cephalosporins Rash    Morphine Rash    Pregabalin Rash     Current Outpatient Medications   Medication Instructions    acetaminophen (TYLENOL) 650 mg, Oral, EVERY 6 HOURS PRN    albuterol (PROVENTIL) 2.5 mg, Nebulization, EVERY 4 HOURS PRN, Asthma DX j45.9    alendronate (FOSAMAX) 70 MG tablet No dose, route, or frequency recorded.     aspirin 81 MG EC tablet Oral, EVERY EVENING    atorvastatin (LIPITOR) 80 mg, Oral, EVERY EVENING    balsalazide (COLAZAL) 1,500 mg, Oral, 2 TIMES DAILY    benazepril (LOTENSIN) 40 MG tablet TAKE ONE TABLET BY MOUTH ONE TIME DAILY    BiPAP Machine MISC Does not apply, 2/ 3LPM or O2    calcium-vitamin D (OSCAL 500 D-3) 500-5 MG-MCG TABS per tablet 1 tablet, Oral    chlorthalidone (HYGROTON) 25 MG tablet TAKE ONE TABLET BY MOUTH ONE TIME DAILY    cloNIDine (CATAPRES) 0.1 mg, Oral, 2 TIMES DAILY    diclofenac sodium (VOLTAREN) 1 % GEL Topical, 4 TIMES DAILY    doxepin (SINEQUAN) 10 mg, Oral, NIGHTLY    DULoxetine (CYMBALTA) 30 MG extended release capsule No dose, route, or frequency recorded. DULoxetine (CYMBALTA) 30 mg, Oral, 3 TIMES DAILY    empagliflozin (JARDIANCE) 10 mg, Oral, DAILY    esomeprazole (NEXIUM) 40 mg, Oral, DAILY    famotidine (PEPCID) 40 MG tablet No dose, route, or frequency recorded. fluticasone (FLONASE) 50 MCG/ACT nasal spray 2 sprays, Nasal, DAILY    labetalol (NORMODYNE) 200 mg, Oral, 2 TIMES DAILY, TAKE ONE TABLET BY MOUTH TWICE A DAY    metFORMIN (OSM) (FORTAMET) 850 mg, 2 TIMES DAILY    mometasone-formoterol (DULERA) 100-5 MCG/ACT inhaler 2 puffs, Inhalation, 2 TIMES DAILY    montelukast (SINGULAIR) 10 mg, Oral    nitroGLYCERIN (NITROSTAT) 0.4 MG SL tablet prn    OXYGEN Inhalation, 3LPM qhs w/ Bipap    OYSCO 500 + D 500-5 MG-MCG TABS No dose, route, or frequency recorded.     polyethylene glycol (GLYCOLAX) 17 g, Oral, DAILY PRN    vitamin B-12 (CYANOCOBALAMIN) 1,000 mcg, Oral, DAILY

## 2023-02-06 ENCOUNTER — OFFICE VISIT (OUTPATIENT)
Dept: PULMONOLOGY | Age: 73
End: 2023-02-06
Payer: MEDICARE

## 2023-02-06 VITALS
RESPIRATION RATE: 19 BRPM | BODY MASS INDEX: 37.71 KG/M2 | SYSTOLIC BLOOD PRESSURE: 140 MMHG | HEART RATE: 58 BPM | HEIGHT: 62 IN | DIASTOLIC BLOOD PRESSURE: 74 MMHG | TEMPERATURE: 97.2 F | OXYGEN SATURATION: 95 % | WEIGHT: 204.9 LBS

## 2023-02-06 DIAGNOSIS — R09.02 HYPOXIA: ICD-10-CM

## 2023-02-06 DIAGNOSIS — J45.40 MODERATE PERSISTENT ASTHMA WITHOUT COMPLICATION: Primary | ICD-10-CM

## 2023-02-06 DIAGNOSIS — G47.33 OBSTRUCTIVE SLEEP APNEA (ADULT) (PEDIATRIC): ICD-10-CM

## 2023-02-06 DIAGNOSIS — I27.20 PULMONARY HYPERTENSION (HCC): ICD-10-CM

## 2023-02-06 PROCEDURE — G8484 FLU IMMUNIZE NO ADMIN: HCPCS | Performed by: NURSE PRACTITIONER

## 2023-02-06 PROCEDURE — 99214 OFFICE O/P EST MOD 30 MIN: CPT | Performed by: NURSE PRACTITIONER

## 2023-02-06 PROCEDURE — 1123F ACP DISCUSS/DSCN MKR DOCD: CPT | Performed by: NURSE PRACTITIONER

## 2023-02-06 PROCEDURE — 1090F PRES/ABSN URINE INCON ASSESS: CPT | Performed by: NURSE PRACTITIONER

## 2023-02-06 PROCEDURE — 3078F DIAST BP <80 MM HG: CPT | Performed by: NURSE PRACTITIONER

## 2023-02-06 PROCEDURE — G8427 DOCREV CUR MEDS BY ELIG CLIN: HCPCS | Performed by: NURSE PRACTITIONER

## 2023-02-06 PROCEDURE — 1036F TOBACCO NON-USER: CPT | Performed by: NURSE PRACTITIONER

## 2023-02-06 PROCEDURE — G8400 PT W/DXA NO RESULTS DOC: HCPCS | Performed by: NURSE PRACTITIONER

## 2023-02-06 PROCEDURE — G8417 CALC BMI ABV UP PARAM F/U: HCPCS | Performed by: NURSE PRACTITIONER

## 2023-02-06 PROCEDURE — 3017F COLORECTAL CA SCREEN DOC REV: CPT | Performed by: NURSE PRACTITIONER

## 2023-02-06 PROCEDURE — 3077F SYST BP >= 140 MM HG: CPT | Performed by: NURSE PRACTITIONER

## 2023-02-06 RX ORDER — FLUTICASONE PROPIONATE 50 MCG
2 SPRAY, SUSPENSION (ML) NASAL DAILY
Qty: 1 EACH | Refills: 11 | Status: SHIPPED | OUTPATIENT
Start: 2023-02-06

## 2023-02-06 RX ORDER — ALBUTEROL SULFATE 2.5 MG/3ML
2.5 SOLUTION RESPIRATORY (INHALATION) EVERY 4 HOURS PRN
Qty: 120 EACH | Refills: 11 | Status: SHIPPED | OUTPATIENT
Start: 2023-02-06 | End: 2023-02-07 | Stop reason: SDUPTHER

## 2023-02-07 ENCOUNTER — OFFICE VISIT (OUTPATIENT)
Dept: CARDIOLOGY CLINIC | Age: 73
End: 2023-02-07
Payer: MEDICARE

## 2023-02-07 VITALS
HEART RATE: 56 BPM | BODY MASS INDEX: 37.76 KG/M2 | SYSTOLIC BLOOD PRESSURE: 134 MMHG | DIASTOLIC BLOOD PRESSURE: 84 MMHG | HEIGHT: 62 IN | WEIGHT: 205.2 LBS

## 2023-02-07 DIAGNOSIS — I25.10 ASCVD (ARTERIOSCLEROTIC CARDIOVASCULAR DISEASE): Primary | ICD-10-CM

## 2023-02-07 DIAGNOSIS — I10 PRIMARY HYPERTENSION: ICD-10-CM

## 2023-02-07 PROCEDURE — 1090F PRES/ABSN URINE INCON ASSESS: CPT | Performed by: INTERNAL MEDICINE

## 2023-02-07 PROCEDURE — 99214 OFFICE O/P EST MOD 30 MIN: CPT | Performed by: INTERNAL MEDICINE

## 2023-02-07 PROCEDURE — G8417 CALC BMI ABV UP PARAM F/U: HCPCS | Performed by: INTERNAL MEDICINE

## 2023-02-07 PROCEDURE — G8400 PT W/DXA NO RESULTS DOC: HCPCS | Performed by: INTERNAL MEDICINE

## 2023-02-07 PROCEDURE — 93000 ELECTROCARDIOGRAM COMPLETE: CPT | Performed by: INTERNAL MEDICINE

## 2023-02-07 PROCEDURE — 3075F SYST BP GE 130 - 139MM HG: CPT | Performed by: INTERNAL MEDICINE

## 2023-02-07 PROCEDURE — 3017F COLORECTAL CA SCREEN DOC REV: CPT | Performed by: INTERNAL MEDICINE

## 2023-02-07 PROCEDURE — G8427 DOCREV CUR MEDS BY ELIG CLIN: HCPCS | Performed by: INTERNAL MEDICINE

## 2023-02-07 PROCEDURE — 3079F DIAST BP 80-89 MM HG: CPT | Performed by: INTERNAL MEDICINE

## 2023-02-07 PROCEDURE — G8484 FLU IMMUNIZE NO ADMIN: HCPCS | Performed by: INTERNAL MEDICINE

## 2023-02-07 PROCEDURE — 1123F ACP DISCUSS/DSCN MKR DOCD: CPT | Performed by: INTERNAL MEDICINE

## 2023-02-07 PROCEDURE — 1036F TOBACCO NON-USER: CPT | Performed by: INTERNAL MEDICINE

## 2023-02-07 RX ORDER — ALBUTEROL SULFATE 2.5 MG/3ML
2.5 SOLUTION RESPIRATORY (INHALATION) EVERY 4 HOURS PRN
Qty: 360 ML | Refills: 11 | Status: SHIPPED | OUTPATIENT
Start: 2023-02-07

## 2023-02-07 NOTE — PROGRESS NOTES
[facility] CARDIOLOGY  [address], [address]  PHONE: [phone]        23        NAME:  Spencer Henley  : 1950  MRN: [id number]     CHIEF COMPLAINT:    Coronary Artery Disease (6 month follow up)         SUBJECTIVE:     No chest pain or palpitation or dizziness. Medications were all reviewed with the patient today and updated as necessary. Current Outpatient Medications   Medication Sig    fluticasone (FLONASE) 50 MCG/ACT nasal spray 2 sprays by Nasal route daily    albuterol (PROVENTIL) (2.5 MG/3ML) 0.083% nebulizer solution Take 3 mLs by nebulization every 4 hours as needed for Wheezing Asthma DX j45.9    alendronate (FOSAMAX) 70 MG tablet     OYSCO 500 + D 500-5 MG-MCG TABS     calcium-vitamin D (OSCAL 500 D-3) 500-5 MG-MCG TABS per tablet Take 1 tablet by mouth    DULoxetine (CYMBALTA) 30 MG extended release capsule     famotidine (PEPCID) 40 MG tablet     doxepin (SINEQUAN) 10 MG capsule Take 1 capsule by mouth nightly    mometasone-formoterol (DULERA) 100-5 MCG/ACT inhaler Inhale 2 puffs into the lungs 2 times daily    benazepril (LOTENSIN) 40 MG tablet TAKE ONE TABLET BY MOUTH ONE TIME DAILY    nitroGLYCERIN (NITROSTAT) 0.4 MG SL tablet prn    labetalol (NORMODYNE) 200 MG tablet Take 1 tablet by mouth 2 times daily TAKE ONE TABLET BY MOUTH TWICE A DAY    OXYGEN Inhale into the lungs 3LPM qhs w/ Bipap    BiPAP Machine MISC by Does not apply route 2/ 3LPM or O2    vitamin B-12 (CYANOCOBALAMIN) 1000 MCG tablet Take 1,000 mcg by mouth in the morning.     acetaminophen (TYLENOL) 325 mg tablet Take 650 mg by mouth every 6 hours as needed for Pain    empagliflozin (JARDIANCE) 10 MG tablet Take 10 mg by mouth daily    aspirin 81 MG EC tablet Take by mouth every evening     atorvastatin (LIPITOR) 80 MG tablet Take 80 mg by mouth every evening     balsalazide (COLAZAL) 750 MG capsule Take 1,500 mg by mouth 2 times daily     chlorthalidone (HYGROTON) 25 MG tablet TAKE ONE TABLET BY MOUTH ONE TIME DAILY    cloNIDine (CATAPRES) 0.1 MG tablet Take 0.1 mg by mouth 2 times daily    diclofenac sodium (VOLTAREN) 1 % GEL Apply topically 4 times daily    DULoxetine (CYMBALTA) 60 MG extended release capsule Take 30 mg by mouth 3 times daily    esomeprazole (NEXIUM) 40 MG delayed release capsule Take 40 mg by mouth daily    montelukast (SINGULAIR) 10 MG tablet Take 10 mg by mouth    polyethylene glycol (GLYCOLAX) 17 GM/SCOOP powder Take 17 g by mouth daily as needed     No current facility-administered medications for this visit. Allergies   Allergen Reactions    Latex Rash    Lidocaine Anaphylaxis     Allergic to all \"KERRIE\"    Propofol Other (See Comments)     Paralysis    Penicillins Other (See Comments)    Ceftriaxone Rash    Cephalosporins Rash    Morphine Rash    Pregabalin Rash           PHYSICAL EXAM:     Wt Readings from Last 3 Encounters:   02/07/23 205 lb 3.2 oz (93.1 kg)   02/06/23 204 lb 14.4 oz (92.9 kg)   01/12/23 206 lb (93.4 kg)     BP Readings from Last 3 Encounters:   02/07/23 134/84   02/06/23 (!) 140/74   01/12/23 (!) 142/66       /84   Pulse 56   Ht 5' 2\" (1.575 m)   Wt 205 lb 3.2 oz (93.1 kg)   BMI 37.53 kg/m²     Physical Exam  Vitals reviewed. HENT:      Head: Normocephalic and atraumatic. Eyes:      Extraocular Movements: Extraocular movements intact. Pupils: Pupils are equal, round, and reactive to light. Cardiovascular:      Rate and Rhythm: Normal rate. Heart sounds: Normal heart sounds. Pulmonary:      Effort: Pulmonary effort is normal.      Breath sounds: Normal breath sounds. Abdominal:      General: Abdomen is flat. Palpations: Abdomen is soft. There is no mass. Musculoskeletal:         General: Normal range of motion. Cervical back: Normal range of motion. Skin:     General: Skin is warm and dry. Neurological:      General: No focal deficit present.       Mental Status: She is alert and oriented to person, place, and time. Psychiatric:         Mood and Affect: Mood normal.         RECENT LABS AND RECORDS REVIEW    Ekg:    Sinus  Bradycardia  - occasional PAC     # PACs = 1.  -Prominent R(V1) -nonspecific. A1c 6.6, creat 1.3    ASSESSMENT and PLAN  1. Crohn's disease of small intestine without complication ([address]  2. Essential hypertension  3. Atherosclerosis of native coronary artery of native heart without angina pectoris  4. Mild intermittent asthma without complication  5. Type 2 diabetes mellitus without complication ([address]  6. Obstructive sleep apnea  7. ILD (interstitial lung disease) ([address]  8. Bronchiectasis without complication ([address]  9. Gastroesophageal reflux disease with esophagitis  10. Other chronic pain  11. Other depression  12: HTN  13: CKD     Essence Chatterjee was seen today for coronary artery disease. Diagnoses and all orders for this visit:    ASCVD (arteriosclerotic cardiovascular disease), pci rca 2016    Primary hypertension    Other orders  -     EKG 12 lead     ////    CV status is stable. Medications and most recent labs reviewed. Diet and exercise are encouraged. Greater  than 50% of today's visit was devoted to counseling the patient, explaining disease concepts and offering advice and suggestions for optimal care. Return in about 1 year (around 2/7/2024).        Kristan Price MD  2/7/2023  2:27 PM

## 2023-02-07 NOTE — TELEPHONE ENCOUNTER
[facility] pharmacy called and needs a new prescription sent. Is it 120 vals or is it 120 ml.  On the [address] [place] [address]

## 2023-02-08 DIAGNOSIS — J45.40 MODERATE PERSISTENT ASTHMA WITHOUT COMPLICATION: ICD-10-CM

## 2023-02-08 DIAGNOSIS — I27.20 PULMONARY HYPERTENSION (HCC): ICD-10-CM

## 2023-02-08 DIAGNOSIS — R09.02 HYPOXIA: Primary | ICD-10-CM

## 2023-02-08 NOTE — TELEPHONE ENCOUNTER
Pharmacy needs a different diagnosis code for this prescription for medicare B to cover      albuterol (PROVENTIL) (2.5 MG/3ML) 0.083% nebulizer solution

## 2023-02-13 RX ORDER — ALBUTEROL SULFATE 2.5 MG/3ML
2.5 SOLUTION RESPIRATORY (INHALATION) EVERY 4 HOURS PRN
Qty: 360 ML | Refills: 11 | Status: SHIPPED | OUTPATIENT
Start: 2023-02-13

## 2023-02-13 NOTE — TELEPHONE ENCOUNTER
Cristina I reorder patient albuterol neb meds with three different dx code will you please sign off thanks . Svetlana correa

## 2023-03-13 DIAGNOSIS — I27.20 PULMONARY HYPERTENSION (HCC): Primary | ICD-10-CM

## 2023-03-13 DIAGNOSIS — I10 HTN (HYPERTENSION): ICD-10-CM

## 2023-03-13 RX ORDER — CLONIDINE HYDROCHLORIDE 0.1 MG/1
TABLET ORAL
Qty: 180 TABLET | Refills: 3 | Status: SHIPPED | OUTPATIENT
Start: 2023-03-13

## 2023-03-13 NOTE — TELEPHONE ENCOUNTER
Requested Prescriptions     Pending Prescriptions Disp Refills    cloNIDine (CATAPRES) 0.1 MG tablet [Pharmacy Med Name: CLONIDINE 0.1 MG TAB[*]] 180 tablet 3     Sig: TAKE ONE TABLET BY MOUTH TWICE A DAY

## 2023-05-12 ENCOUNTER — TELEPHONE (OUTPATIENT)
Dept: SLEEP MEDICINE | Age: 73
End: 2023-05-12

## 2023-05-15 ENCOUNTER — OFFICE VISIT (OUTPATIENT)
Dept: SLEEP MEDICINE | Age: 73
End: 2023-05-15
Payer: MEDICARE

## 2023-05-15 VITALS
DIASTOLIC BLOOD PRESSURE: 65 MMHG | OXYGEN SATURATION: 95 % | TEMPERATURE: 97 F | WEIGHT: 207 LBS | HEIGHT: 62 IN | HEART RATE: 53 BPM | SYSTOLIC BLOOD PRESSURE: 153 MMHG | BODY MASS INDEX: 38.09 KG/M2

## 2023-05-15 DIAGNOSIS — G25.81 RLS (RESTLESS LEGS SYNDROME): ICD-10-CM

## 2023-05-15 DIAGNOSIS — G47.00 PERSISTENT DISORDER OF INITIATING OR MAINTAINING SLEEP: ICD-10-CM

## 2023-05-15 DIAGNOSIS — E61.1 IRON DEFICIENCY: ICD-10-CM

## 2023-05-15 DIAGNOSIS — G47.33 OSA (OBSTRUCTIVE SLEEP APNEA): Primary | ICD-10-CM

## 2023-05-15 DIAGNOSIS — G47.34 NOCTURNAL HYPOXEMIA: ICD-10-CM

## 2023-05-15 DIAGNOSIS — G47.10 HYPERSOMNIA: ICD-10-CM

## 2023-05-15 PROCEDURE — G8427 DOCREV CUR MEDS BY ELIG CLIN: HCPCS | Performed by: NURSE PRACTITIONER

## 2023-05-15 PROCEDURE — 3078F DIAST BP <80 MM HG: CPT | Performed by: NURSE PRACTITIONER

## 2023-05-15 PROCEDURE — G8417 CALC BMI ABV UP PARAM F/U: HCPCS | Performed by: NURSE PRACTITIONER

## 2023-05-15 PROCEDURE — 1123F ACP DISCUSS/DSCN MKR DOCD: CPT | Performed by: NURSE PRACTITIONER

## 2023-05-15 PROCEDURE — G8400 PT W/DXA NO RESULTS DOC: HCPCS | Performed by: NURSE PRACTITIONER

## 2023-05-15 PROCEDURE — 1036F TOBACCO NON-USER: CPT | Performed by: NURSE PRACTITIONER

## 2023-05-15 PROCEDURE — 3017F COLORECTAL CA SCREEN DOC REV: CPT | Performed by: NURSE PRACTITIONER

## 2023-05-15 PROCEDURE — 99213 OFFICE O/P EST LOW 20 MIN: CPT | Performed by: NURSE PRACTITIONER

## 2023-05-15 PROCEDURE — 3077F SYST BP >= 140 MM HG: CPT | Performed by: NURSE PRACTITIONER

## 2023-05-15 PROCEDURE — 1090F PRES/ABSN URINE INCON ASSESS: CPT | Performed by: NURSE PRACTITIONER

## 2023-05-15 ASSESSMENT — SLEEP AND FATIGUE QUESTIONNAIRES
HOW LIKELY ARE YOU TO NOD OFF OR FALL ASLEEP WHILE SITTING QUIETLY AFTER LUNCH WITHOUT ALCOHOL: 1
HOW LIKELY ARE YOU TO NOD OFF OR FALL ASLEEP WHILE SITTING AND TALKING TO SOMEONE: 1
HOW LIKELY ARE YOU TO NOD OFF OR FALL ASLEEP WHILE LYING DOWN TO REST IN THE AFTERNOON WHEN CIRCUMSTANCES PERMIT: 2
HOW LIKELY ARE YOU TO NOD OFF OR FALL ASLEEP WHILE SITTING AND READING: 3
HOW LIKELY ARE YOU TO NOD OFF OR FALL ASLEEP WHILE SITTING INACTIVE IN A PUBLIC PLACE: 1
HOW LIKELY ARE YOU TO NOD OFF OR FALL ASLEEP IN A CAR, WHILE STOPPED FOR A FEW MINUTES IN TRAFFIC: 1
ESS TOTAL SCORE: 12
HOW LIKELY ARE YOU TO NOD OFF OR FALL ASLEEP WHILE WATCHING TV: 2
HOW LIKELY ARE YOU TO NOD OFF OR FALL ASLEEP WHEN YOU ARE A PASSENGER IN A CAR FOR AN HOUR WITHOUT A BREAK: 1

## 2023-05-15 NOTE — PATIENT INSTRUCTIONS
Continue BiPAP 14/9 cm H2O with nightly compliance  Supply order is current  Ferritin level check  Recommendations as above  Follow-up in 3 months or sooner if needed

## 2023-05-16 ENCOUNTER — TELEPHONE (OUTPATIENT)
Dept: SLEEP MEDICINE | Age: 73
End: 2023-05-16

## 2023-05-16 DIAGNOSIS — E61.1 IRON DEFICIENCY: Primary | ICD-10-CM

## 2023-05-16 LAB — FERRITIN SERPL-MCNC: 6 NG/ML (ref 8–388)

## 2023-05-16 RX ORDER — FERROUS SULFATE 325(65) MG
325 TABLET ORAL 2 TIMES DAILY
Qty: 60 TABLET | Refills: 1 | Status: SHIPPED | OUTPATIENT
Start: 2023-05-16

## 2023-05-16 NOTE — TELEPHONE ENCOUNTER
Patient called and notified of low ferritin. Ferritin needs to be replaced. Start ferrous sulfate 325 mg one tablet BID x 1 month, then one tablet daily. Take ferrous sulfate with Vit C to aid in absorption. Ferrous sulfate can cause constipation and a stool softener may be needed. Orders Placed This Encounter    ferrous sulfate (IRON 325) 325 (65 Fe) MG tablet     Sig: Take 1 tablet by mouth 2 times daily Start ferrous sulfate 325 mg one tablet BID x 1 month, then one tablet daily. Take ferrous sulfate with Vit C to aid in absorption. Ferrous sulfate can cause constipation and a stool softener may be needed.      Dispense:  60 tablet     Refill:  1

## 2023-08-06 NOTE — PROGRESS NOTES
from the past 3650 days. PFTs:   Office Spirometry Results Latest Ref Rng & Units 8/7/2023   FVC L 1.88   FEV1 L 1.55   FEV1 %PRED-PRE % 79   FVC %PRED-PRE % 72   FEV1/FVC % 83     No results found for this or any previous visit. No results found for this or any previous visit. FeNO: No results found for this or any previous visit. FeNO and Likelihood of Eosinophilic Asthma   Unlikely Intermediate Likely   <25 ppb 25-50 ppb >50ppb     Exercise Oximetry:   O2 sat room air rest is 94%. O2 sat room air exertion is 87%. O2 sat 2 lpm exertion is 96% )    Echo:   TRANSTHORACIC ECHOCARDIOGRAM (TTE) COMPLETE (CONTRAST/BUBBLE/3D PRN) 02/27/2023    Interpretation Summary    Left Ventricle: Normal left ventricular systolic function with a visually estimated EF of 60 - 65%. Left ventricle size is normal. Mildly increased wall thickness. Normal wall motion. Abnormal diastolic function. Mitral Valve: Mild regurgitation. Tricuspid Valve: Moderate regurgitation. Moderately to severely elevated RVSP. The estimated RVSP is 60 mmHg. Left Atrium: Left atrium is mildly dilated. LA Vol Index is  37 ml/m2. Technical qualifiers: Color flow Doppler was performed and pulse wave and/or continuous wave Doppler was performed.       [facility] Reference Info:                                                                                                                  Immunization History   Administered Date(s) Administered    COVID-19, MODERNA BLUE border, Primary or Immunocompromised, (age 12y+), IM, 100 mcg/0.5mL 03/11/2021, 04/04/2021, 01/15/2022    Influenza Trivalent 10/05/2015    Influenza Virus Vaccine 10/01/2016, 10/01/2017, 10/01/2018, 10/01/2020, 01/15/2022    Pneumococcal Vaccine 09/01/2013    Pneumococcal, PCV-13, PREVNAR 13, (age 6w+), IM, 0.5mL 06/06/2017     Past Medical History:   Diagnosis Date    Arthritis     tylenol arthritis    Asthma     daily inhler; used rescue inhaler 1 month ago (Nov 2018)- last used

## 2023-08-07 ENCOUNTER — OFFICE VISIT (OUTPATIENT)
Dept: PULMONOLOGY | Age: 73
End: 2023-08-07
Payer: MEDICARE

## 2023-08-07 VITALS
OXYGEN SATURATION: 93 % | BODY MASS INDEX: 38.96 KG/M2 | RESPIRATION RATE: 18 BRPM | SYSTOLIC BLOOD PRESSURE: 114 MMHG | TEMPERATURE: 97.2 F | DIASTOLIC BLOOD PRESSURE: 66 MMHG | WEIGHT: 211.7 LBS | HEART RATE: 57 BPM | HEIGHT: 62 IN

## 2023-08-07 DIAGNOSIS — I27.20 PULMONARY HYPERTENSION (HCC): ICD-10-CM

## 2023-08-07 DIAGNOSIS — J45.30 MILD PERSISTENT ASTHMA WITHOUT COMPLICATION: Primary | ICD-10-CM

## 2023-08-07 DIAGNOSIS — G47.33 OBSTRUCTIVE SLEEP APNEA (ADULT) (PEDIATRIC): ICD-10-CM

## 2023-08-07 DIAGNOSIS — R09.02 HYPOXEMIA: ICD-10-CM

## 2023-08-07 DIAGNOSIS — R06.09 DYSPNEA ON EXERTION: ICD-10-CM

## 2023-08-07 DIAGNOSIS — R91.1 LUNG NODULE: ICD-10-CM

## 2023-08-07 DIAGNOSIS — J98.4 RESTRICTIVE LUNG DISEASE: ICD-10-CM

## 2023-08-07 LAB
EXPIRATORY TIME: NORMAL
FEF 25-75% %PRED-PRE: NORMAL
FEF 25-75% PRED: NORMAL
FEF 25-75%-PRE: NORMAL
FEV1 %PRED-PRE: 79 %
FEV1 PRED: NORMAL
FEV1/FVC %PRED-PRE: NORMAL
FEV1/FVC PRED: NORMAL
FEV1/FVC: 83 %
FEV1: 1.55 L
FVC %PRED-PRE: 72 %
FVC PRED: NORMAL
FVC: 1.88 L
PEF %PRED-PRE: NORMAL
PEF PRED: NORMAL
PEF-PRE: NORMAL

## 2023-08-07 PROCEDURE — 99215 OFFICE O/P EST HI 40 MIN: CPT | Performed by: NURSE PRACTITIONER

## 2023-08-07 PROCEDURE — 1123F ACP DISCUSS/DSCN MKR DOCD: CPT | Performed by: NURSE PRACTITIONER

## 2023-08-07 PROCEDURE — G8417 CALC BMI ABV UP PARAM F/U: HCPCS | Performed by: NURSE PRACTITIONER

## 2023-08-07 PROCEDURE — 3017F COLORECTAL CA SCREEN DOC REV: CPT | Performed by: NURSE PRACTITIONER

## 2023-08-07 PROCEDURE — G8427 DOCREV CUR MEDS BY ELIG CLIN: HCPCS | Performed by: NURSE PRACTITIONER

## 2023-08-07 PROCEDURE — 3074F SYST BP LT 130 MM HG: CPT | Performed by: NURSE PRACTITIONER

## 2023-08-07 PROCEDURE — 94010 BREATHING CAPACITY TEST: CPT | Performed by: INTERNAL MEDICINE

## 2023-08-07 PROCEDURE — 1036F TOBACCO NON-USER: CPT | Performed by: NURSE PRACTITIONER

## 2023-08-07 PROCEDURE — G8400 PT W/DXA NO RESULTS DOC: HCPCS | Performed by: NURSE PRACTITIONER

## 2023-08-07 PROCEDURE — 3078F DIAST BP <80 MM HG: CPT | Performed by: NURSE PRACTITIONER

## 2023-08-07 PROCEDURE — 1090F PRES/ABSN URINE INCON ASSESS: CPT | Performed by: NURSE PRACTITIONER

## 2023-08-07 RX ORDER — MONTELUKAST SODIUM 10 MG/1
10 TABLET ORAL NIGHTLY
Qty: 90 TABLET | Refills: 3 | Status: SHIPPED | OUTPATIENT
Start: 2023-08-07

## 2023-08-07 ASSESSMENT — PULMONARY FUNCTION TESTS
FEV1_PERCENT_PREDICTED_PRE: 79
FEV1: 1.55
FVC: 1.88
FVC_PERCENT_PREDICTED_PRE: 72
FEV1/FVC: 83

## 2023-08-09 ENCOUNTER — NURSE ONLY (OUTPATIENT)
Dept: PULMONOLOGY | Age: 73
End: 2023-08-09

## 2023-08-09 DIAGNOSIS — I27.20 PULMONARY HYPERTENSION (HCC): Primary | ICD-10-CM

## 2023-08-09 DIAGNOSIS — R06.09 DYSPNEA ON EXERTION: ICD-10-CM

## 2023-08-09 DIAGNOSIS — J98.4 RESTRICTIVE LUNG DISEASE: ICD-10-CM

## 2023-08-09 LAB
FEF25-27, POC: 1.45 L/S
FET, POC: NORMAL
FEV 1 , POC: 1.43 L
FEV1/FVC, POC: NORMAL
FVC, POC: NORMAL
LUNG AGE, POC: NORMAL
PEF, POC: 3.63 L/S

## 2023-08-12 NOTE — RESULT ENCOUNTER NOTE
Please let patient know that breathing tests show mild stiffness of lungs. This could be related to her elevated heart pressures as well as her weight. Please make arrangements for her to see Dr. Becky Zarate for pHTN.

## 2023-08-21 ENCOUNTER — TELEPHONE (OUTPATIENT)
Dept: SLEEP MEDICINE | Age: 73
End: 2023-08-21

## 2023-08-21 NOTE — PROGRESS NOTES
extremities. ABDOMEN:   Soft and non-tender. No hepatosplenomegaly. Bowel sounds are normal.     NEURO:   The patient is alert and oriented to person, place, and time. Memory appears intact and mood is normal.  No gross sensorimotor deficits are present. ASSESSMENT:  (Medical Decision Making)       ICD-10-CM    1. NEFTALI (obstructive sleep apnea)  G47.33 DME - DURABLE MEDICAL EQUIPMENT -Patient is using, compliant and benefiting from Pap therapy. Continue current settings as AHI is down to 0.5 events per hour. 2. Nocturnal hypoxemia  G47.34 Pulse oximetry, overnight. We will check overnight oximetry to ensure nocturnal hypoxemia is not contributing to her hypersomnia. Continue 3 L oxygen bleed in with BIPAP      3. Hypersomnia  G47.10 Advised patient the driving risk associated with excessive daytime sleepiness. She reports that her granddaughter drives her everywhere. We will check overnight oximetry and reevaluate hypersomnia at next follow-up      4. RLS (restless legs syndrome)  G25.81 Ferritin. Granddaughter reports that she is no longer complaining of her legs hurting at night since starting the iron. Patient reports that her legs do not bother her as much anymore. We will recheck ferritin level today to see if she needs to continue iron supplementation. 5. Iron deficiency  E61.1 Ferritin             PLAN:    Continue BiPAP 14/9 cm H2O with nightly compliance  New supplies ordered  Overnight oximetry ordered  Ferritin level check  Recommendations as above  Follow-up in 4 months or sooner if needed    Orders Placed This Encounter   Procedures    Ferritin     Standing Status:   Future     Number of Occurrences:   1     Standing Expiration Date:   8/22/2024    Pulse oximetry, overnight     Standing Status:   Future     Standing Expiration Date:   2/22/2024     Scheduling Instructions:      Please perform Overnight Oximetry on PAP Therapy.        Please Fax results to: [phone]

## 2023-08-22 ENCOUNTER — OFFICE VISIT (OUTPATIENT)
Dept: SLEEP MEDICINE | Age: 73
End: 2023-08-22
Payer: MEDICARE

## 2023-08-22 VITALS
WEIGHT: 214 LBS | TEMPERATURE: 97.2 F | BODY MASS INDEX: 39.38 KG/M2 | HEIGHT: 62 IN | SYSTOLIC BLOOD PRESSURE: 140 MMHG | OXYGEN SATURATION: 94 % | DIASTOLIC BLOOD PRESSURE: 60 MMHG | HEART RATE: 57 BPM

## 2023-08-22 DIAGNOSIS — G25.81 RLS (RESTLESS LEGS SYNDROME): ICD-10-CM

## 2023-08-22 DIAGNOSIS — G47.10 HYPERSOMNIA: ICD-10-CM

## 2023-08-22 DIAGNOSIS — G47.34 NOCTURNAL HYPOXEMIA: ICD-10-CM

## 2023-08-22 DIAGNOSIS — E61.1 IRON DEFICIENCY: ICD-10-CM

## 2023-08-22 DIAGNOSIS — G47.33 OSA (OBSTRUCTIVE SLEEP APNEA): Primary | ICD-10-CM

## 2023-08-22 LAB — FERRITIN SERPL-MCNC: 15 NG/ML (ref 8–388)

## 2023-08-22 PROCEDURE — 3017F COLORECTAL CA SCREEN DOC REV: CPT | Performed by: NURSE PRACTITIONER

## 2023-08-22 PROCEDURE — 1090F PRES/ABSN URINE INCON ASSESS: CPT | Performed by: NURSE PRACTITIONER

## 2023-08-22 PROCEDURE — 99213 OFFICE O/P EST LOW 20 MIN: CPT | Performed by: NURSE PRACTITIONER

## 2023-08-22 PROCEDURE — 1036F TOBACCO NON-USER: CPT | Performed by: NURSE PRACTITIONER

## 2023-08-22 PROCEDURE — 3077F SYST BP >= 140 MM HG: CPT | Performed by: NURSE PRACTITIONER

## 2023-08-22 PROCEDURE — G8427 DOCREV CUR MEDS BY ELIG CLIN: HCPCS | Performed by: NURSE PRACTITIONER

## 2023-08-22 PROCEDURE — G8400 PT W/DXA NO RESULTS DOC: HCPCS | Performed by: NURSE PRACTITIONER

## 2023-08-22 PROCEDURE — 3078F DIAST BP <80 MM HG: CPT | Performed by: NURSE PRACTITIONER

## 2023-08-22 PROCEDURE — G8417 CALC BMI ABV UP PARAM F/U: HCPCS | Performed by: NURSE PRACTITIONER

## 2023-08-22 PROCEDURE — 1123F ACP DISCUSS/DSCN MKR DOCD: CPT | Performed by: NURSE PRACTITIONER

## 2023-08-22 RX ORDER — FERROUS SULFATE 325(65) MG
325 TABLET ORAL 2 TIMES DAILY
Qty: 60 TABLET | Refills: 1 | Status: CANCELLED | OUTPATIENT
Start: 2023-08-22

## 2023-08-22 ASSESSMENT — SLEEP AND FATIGUE QUESTIONNAIRES
HOW LIKELY ARE YOU TO NOD OFF OR FALL ASLEEP WHILE LYING DOWN TO REST IN THE AFTERNOON WHEN CIRCUMSTANCES PERMIT: 3
HOW LIKELY ARE YOU TO NOD OFF OR FALL ASLEEP WHILE SITTING INACTIVE IN A PUBLIC PLACE: 3
HOW LIKELY ARE YOU TO NOD OFF OR FALL ASLEEP WHILE SITTING QUIETLY AFTER LUNCH WITHOUT ALCOHOL: 3
ESS TOTAL SCORE: 19
HOW LIKELY ARE YOU TO NOD OFF OR FALL ASLEEP WHEN YOU ARE A PASSENGER IN A CAR FOR AN HOUR WITHOUT A BREAK: 3
HOW LIKELY ARE YOU TO NOD OFF OR FALL ASLEEP WHILE WATCHING TV: 3
HOW LIKELY ARE YOU TO NOD OFF OR FALL ASLEEP IN A CAR, WHILE STOPPED FOR A FEW MINUTES IN TRAFFIC: 1
HOW LIKELY ARE YOU TO NOD OFF OR FALL ASLEEP WHILE SITTING AND TALKING TO SOMEONE: 0
HOW LIKELY ARE YOU TO NOD OFF OR FALL ASLEEP WHILE SITTING AND READING: 3

## 2023-08-22 NOTE — PATIENT INSTRUCTIONS
Continue BiPAP 14/9 cm H2O with nightly compliance  New supplies ordered  Overnight oximetry ordered  Ferritin level check  Recommendations as above  Follow-up in 4 months or sooner if needed

## 2023-08-24 ENCOUNTER — TELEPHONE (OUTPATIENT)
Dept: SLEEP MEDICINE | Age: 73
End: 2023-08-24

## 2023-08-24 DIAGNOSIS — E61.1 IRON DEFICIENCY: Primary | ICD-10-CM

## 2023-08-24 RX ORDER — FERROUS SULFATE 325(65) MG
325 TABLET ORAL 2 TIMES DAILY
Qty: 60 TABLET | Refills: 2 | Status: SHIPPED | OUTPATIENT
Start: 2023-08-24

## 2023-08-24 NOTE — TELEPHONE ENCOUNTER
Patient called and notified of low ferritin. Ferritin needs to be replaced. Start ferrous sulfate 325 mg one tablet BID x 1 month, then one tablet daily. Take ferrous sulfate with Vit C to aid in absorption. Ferrous sulfate can cause constipation and a stool softener may be needed. Orders Placed This Encounter    ferrous sulfate (IRON 325) 325 (65 Fe) MG tablet     Sig: Take 1 tablet by mouth 2 times daily Start ferrous sulfate 325 mg one tablet BID x 1 month, then one tablet daily. Take ferrous sulfate with Vit C to aid in absorption. Ferrous sulfate can cause constipation and a stool softener may be needed.      Dispense:  60 tablet     Refill:  2

## 2023-09-25 ENCOUNTER — TELEPHONE (OUTPATIENT)
Dept: PULMONOLOGY | Age: 73
End: 2023-09-25

## 2023-09-25 NOTE — TELEPHONE ENCOUNTER
Patient says that she has not heard from her DME about the POC and also she has not heard from them about the YARON test

## 2023-09-26 ENCOUNTER — TELEPHONE (OUTPATIENT)
Dept: PULMONOLOGY | Age: 73
End: 2023-09-26

## 2023-09-26 ENCOUNTER — TELEMEDICINE (OUTPATIENT)
Dept: PULMONOLOGY | Age: 73
End: 2023-09-26
Payer: MEDICARE

## 2023-09-26 DIAGNOSIS — R09.02 HYPOXEMIA: ICD-10-CM

## 2023-09-26 DIAGNOSIS — J45.41 MODERATE PERSISTENT ASTHMA WITH EXACERBATION: Primary | ICD-10-CM

## 2023-09-26 DIAGNOSIS — Z71.85 IMMUNIZATION COUNSELING: ICD-10-CM

## 2023-09-26 PROCEDURE — 3017F COLORECTAL CA SCREEN DOC REV: CPT | Performed by: NURSE PRACTITIONER

## 2023-09-26 PROCEDURE — 1090F PRES/ABSN URINE INCON ASSESS: CPT | Performed by: NURSE PRACTITIONER

## 2023-09-26 PROCEDURE — G8427 DOCREV CUR MEDS BY ELIG CLIN: HCPCS | Performed by: NURSE PRACTITIONER

## 2023-09-26 PROCEDURE — 99213 OFFICE O/P EST LOW 20 MIN: CPT | Performed by: NURSE PRACTITIONER

## 2023-09-26 PROCEDURE — G8400 PT W/DXA NO RESULTS DOC: HCPCS | Performed by: NURSE PRACTITIONER

## 2023-09-26 PROCEDURE — 1123F ACP DISCUSS/DSCN MKR DOCD: CPT | Performed by: NURSE PRACTITIONER

## 2023-09-26 RX ORDER — PREDNISONE 20 MG/1
TABLET ORAL
Qty: 15 TABLET | Refills: 0 | Status: SHIPPED | OUTPATIENT
Start: 2023-09-26

## 2023-09-26 RX ORDER — DOXYCYCLINE HYCLATE 100 MG
100 TABLET ORAL 2 TIMES DAILY
Qty: 14 TABLET | Refills: 0 | Status: SHIPPED | OUTPATIENT
Start: 2023-09-26 | End: 2023-10-03

## 2023-09-26 NOTE — TELEPHONE ENCOUNTER
[facility] CALL      Complaint: Coughing./nasal congestion  Cough: yes  Productive:  yes  Bloody Sputum:  no  Increased SOB/Wheezing:  yes  Duration: 3 days  Fever/Chills: no  OTC Meds tried: mucinex  Asking if she can be seen today

## 2023-09-26 NOTE — TELEPHONE ENCOUNTER
I called and spoke with patient on 09/25/2023 to let her know that the order is in to resource and that she needs o reach out to them to see when they will set her up. Gorge correa

## 2023-09-26 NOTE — PROGRESS NOTES
Name:  Neetu Kendrick  YOB: 1950   MRN: [id number]      Virtual Visit: 9/26/2023           Neetu Kendrick was evaluated through a synchronous (real-time) audio-video encounter. The patient (or guardian if applicable) is aware that this is a billable service, which includes applicable co-pays. This Virtual Visit was conducted with patient's (and/or legal guardian's) consent. The visit was conducted pursuant to the emergency declaration under the [address] authority and the [employer] General Act. Patient identification was verified, and a caregiver was present when appropriate. The patient was located in a state where the provider was licensed to provide care. Services were provided through a video synchronous discussion virtually to substitute for in-person clinic visit. Neetu Kendrick was evaluated through a synchronous (real-time) audio-video encounter. The patient (or guardian if applicable) is aware that this is a billable service, which includes applicable co-pays. This Virtual Visit was conducted with patient's (and/or legal guardian's) consent. Patient identification was verified, and a caregiver was present when appropriate. The patient was located at Home: [address] [place] [address] [address]  Provider was located at Facility (Appt Dept): [address] [facility]  [place] [address]         Total time spent for this encounter: Not billed by time    --RHODA Ley CNP on 9/26/2023 at 1:58 PM    An electronic signature was used to authenticate this note. ASSESSMENT AND PLAN:  (Medical Decision Making)    Impression: 75 y.o. female     1. Moderate persistent asthma with exacerbation  --patient with acute asthma exacerbation for the past 3 days associated with cough, wheezing, and sputum production.   Will treat with doxycycline 100 mg

## 2023-09-26 NOTE — TELEPHONE ENCOUNTER
I called and spoke with patient to let her know that we did get fax from [facility] it was signed and faxed back hopefully she will hear from them soon. Ximena correa

## 2023-09-26 NOTE — TELEPHONE ENCOUNTER
LOV 8/7/2023 Mrs Reid Willoughby asthma, pulmonary hypertension, lung nodule, RLD, NEFTALI on BIPAP at 3 lpm, hypoxemia, BETANCOURT, bronchiectasis    I have spoken with patient. She reports no known fever but has not checked. She reports wheezing. She is coughing a lot and is coughing up yellow or white. She reports sore bad and ear. Temp 98.9 orally. She reports increased SOB with activity. She reports symptoms started 3 days ago. She reports that she has not done a COVID test. Patient is reporting body aches. She reports raw throat. She is taking Dulera as directed. Patient is taking Singulair, Mucinex, Flonase and using Albuterol nebulizer every four hours and rescue inhaler maybe 1-2 times daily. Confirms wearing BIPAP with O2 at 3 lpm. Patient has been using O2 at 2 LPM at rest periodically and currently is not wearing with SpO2 of 93% on RA. Patient is scheduled for VV MyChart appointment with Mrs Reid Willoughby today at 1:30. Patient has access to do COVID test prior to this appointment and will do so. Patient has a friend with her who is able to help patient with VV MyChart appointment starting about 1:10 today. Friend reporting sore throat as well. Notified Mrs Reid Willoughby and her MA of add on appointment.

## 2023-09-26 NOTE — TELEPHONE ENCOUNTER
I called and spoke with patient and grand daughter to let her know that I spoke with ryan with [facility] she states they have fax a paper over that need to be signed. Tye Fleming has not got so they are Willard re fax it to us. I told her once we got it I will call her back and if she does not heard from anyone to call me back on Friday. Derick correa

## 2023-10-01 DIAGNOSIS — I10 HTN (HYPERTENSION): ICD-10-CM

## 2023-10-01 DIAGNOSIS — I20.9 ANGINA PECTORIS, UNSPECIFIED (HCC): ICD-10-CM

## 2023-10-02 RX ORDER — LABETALOL 200 MG/1
200 TABLET, FILM COATED ORAL 2 TIMES DAILY
Qty: 180 TABLET | Refills: 3 | Status: SHIPPED | OUTPATIENT
Start: 2023-10-02

## 2023-10-02 RX ORDER — BENAZEPRIL HYDROCHLORIDE 40 MG/1
TABLET, FILM COATED ORAL
Qty: 90 TABLET | Refills: 3 | Status: SHIPPED | OUTPATIENT
Start: 2023-10-02

## 2023-10-13 RX ORDER — DOXEPIN HYDROCHLORIDE 10 MG/1
10 CAPSULE ORAL NIGHTLY
Qty: 30 CAPSULE | Refills: 4 | Status: SHIPPED | OUTPATIENT
Start: 2023-10-13

## 2023-10-13 NOTE — TELEPHONE ENCOUNTER
Called and spoke with the patient's granddaughter who is her care provider. The patient had been prescribed doxepin in January of this year by Dr. Celeste Ho. I have seen her 2 times since that visit and she initially had reported that she trialed the doxepin but did not like how it made her feel so she was no longer using it. Her granddaughter reports that she still had some medication left and after her last visit with me she decided to try the doxepin again and it has been working very well for her. States she has not had any problems with side effects. She reports that she only has 1 pill left and is getting ready to go out of town so she was trying to get this refilled for her. I will refill the medication today.

## 2023-10-13 NOTE — TELEPHONE ENCOUNTER
Patient has 1 dose of medicine for tonight:    doxepin (SINEQUAN) 10 MG capsule     Will need this called in today is going out of town on Sunday

## 2023-11-07 NOTE — PROGRESS NOTES
Name:  Kim Desai  YOB: 1950   MRN: [id number]      Office Visit: 11/8/2023        ASSESSMENT AND PLAN:  (Medical Decision Making)    Impression: 75 y.o. female with history of asthma and worsening BETANCOURT. Also noted to have worsening RVSP on echo. 1. Dyspnea on exertion  --worsening. NATACHA scale today is 5. Need to pursue completion work up for [address] [place] [address] Blvd. Check CXR, V/Q scan, HIV panel, hepatitis panel, and WILLIAMS. - XR CHEST PA LAT (2 VIEWS); Future  - NM LUNG VENT/PERFUSION (VQ); Future  - HIV 1/2 Ag/Ab, 4TH Generation,W Rflx Confirm; Future  - Hepatitis C Antibody; Future  - WILLIAMS, Direct, w/Reflex; Future    2. Pulmonary hypertension (HCC)  --RVSP up to 60 mm Hg on last echo. Worsening DLCO. Will arrange for RHC. - XR CHEST PA LAT (2 VIEWS); Future  - NM LUNG VENT/PERFUSION (VQ); Future  - HIV 1/2 Ag/Ab, 4TH Generation,W Rflx Confirm; Future  - Hepatitis C Antibody; Future  - WILLIAMS, Direct, w/Reflex; Future    3. Mild persistent asthma without complication  --continue Dulera. This does not appear to be contributing to her BETANCOURT. 4. Lung nodule  --5 mm LLL nodule in a never smoker. No further follow up is needed on this. No orders of the defined types were placed in this encounter. No orders of the defined types were placed in this encounter. Follow-up and Dispositions    Return for DR GUZMÁN [facility] or [facility] for [place]--next aviailable. RHODA Moise - CNP  Collaborating physician is Dr. Jaime Mendoza. ADS    No specialty comments available. Total time for encounter on day of encounter was 50 minutes. This time includes chart prep, review of tests/procedures, review of other provider's notes, documentation and counseling patient regarding disease process and medications.    _________________________________________________________________________    HISTORY OF PRESENT ILLNESS:    Ms. Dayami Parnell is a 75 y.o. female who is seen at [facility] for  Asthma

## 2023-11-08 ENCOUNTER — HOSPITAL ENCOUNTER (OUTPATIENT)
Dept: GENERAL RADIOLOGY | Age: 73
Discharge: HOME | End: 2023-11-11
Payer: MEDICARE

## 2023-11-08 ENCOUNTER — OFFICE VISIT (OUTPATIENT)
Dept: PULMONOLOGY | Age: 73
End: 2023-11-08
Payer: MEDICARE

## 2023-11-08 VITALS
OXYGEN SATURATION: 95 % | SYSTOLIC BLOOD PRESSURE: 120 MMHG | WEIGHT: 222.2 LBS | BODY MASS INDEX: 40.89 KG/M2 | RESPIRATION RATE: 18 BRPM | DIASTOLIC BLOOD PRESSURE: 68 MMHG | HEART RATE: 51 BPM | HEIGHT: 62 IN | TEMPERATURE: 97.2 F

## 2023-11-08 DIAGNOSIS — R06.09 DYSPNEA ON EXERTION: Primary | ICD-10-CM

## 2023-11-08 DIAGNOSIS — I27.20 PULMONARY HYPERTENSION (HCC): ICD-10-CM

## 2023-11-08 DIAGNOSIS — R06.09 DYSPNEA ON EXERTION: ICD-10-CM

## 2023-11-08 DIAGNOSIS — R91.1 LUNG NODULE: ICD-10-CM

## 2023-11-08 DIAGNOSIS — J45.30 MILD PERSISTENT ASTHMA WITHOUT COMPLICATION: ICD-10-CM

## 2023-11-08 LAB
HCV AB SER QL: NONREACTIVE
HIV 1+2 AB+HIV1 P24 AG SERPL QL IA: NONREACTIVE
HIV 1/2 RESULT COMMENT: NORMAL

## 2023-11-08 PROCEDURE — 1123F ACP DISCUSS/DSCN MKR DOCD: CPT | Performed by: NURSE PRACTITIONER

## 2023-11-08 PROCEDURE — 3078F DIAST BP <80 MM HG: CPT | Performed by: NURSE PRACTITIONER

## 2023-11-08 PROCEDURE — 3017F COLORECTAL CA SCREEN DOC REV: CPT | Performed by: NURSE PRACTITIONER

## 2023-11-08 PROCEDURE — G8417 CALC BMI ABV UP PARAM F/U: HCPCS | Performed by: NURSE PRACTITIONER

## 2023-11-08 PROCEDURE — 1036F TOBACCO NON-USER: CPT | Performed by: NURSE PRACTITIONER

## 2023-11-08 PROCEDURE — G8484 FLU IMMUNIZE NO ADMIN: HCPCS | Performed by: NURSE PRACTITIONER

## 2023-11-08 PROCEDURE — G8399 PT W/DXA RESULTS DOCUMENT: HCPCS | Performed by: NURSE PRACTITIONER

## 2023-11-08 PROCEDURE — 99215 OFFICE O/P EST HI 40 MIN: CPT | Performed by: NURSE PRACTITIONER

## 2023-11-08 PROCEDURE — 71046 X-RAY EXAM CHEST 2 VIEWS: CPT

## 2023-11-08 PROCEDURE — 1090F PRES/ABSN URINE INCON ASSESS: CPT | Performed by: NURSE PRACTITIONER

## 2023-11-08 PROCEDURE — 3074F SYST BP LT 130 MM HG: CPT | Performed by: NURSE PRACTITIONER

## 2023-11-08 PROCEDURE — G8427 DOCREV CUR MEDS BY ELIG CLIN: HCPCS | Performed by: NURSE PRACTITIONER

## 2023-11-08 NOTE — PATIENT INSTRUCTIONS
I have ordered V/Q scan. You should receive a call from scheduling within 2-3 business days.   If you do not hear from them, please call [phone] to schedule your test.

## 2023-11-10 ENCOUNTER — TRANSCRIBE ORDERS (OUTPATIENT)
Dept: SCHEDULING | Age: 73
End: 2023-11-10

## 2023-11-10 DIAGNOSIS — I27.20 PULMONARY HYPERTENSION (HCC): ICD-10-CM

## 2023-11-10 DIAGNOSIS — R06.09 DYSPNEA ON EXERTION: Primary | ICD-10-CM

## 2023-11-10 LAB — ANA SER QL: NEGATIVE

## 2023-11-14 DIAGNOSIS — G47.34 NOCTURNAL HYPOXEMIA: ICD-10-CM

## 2023-11-14 NOTE — RESULT ENCOUNTER NOTE
Please let patient know that YARON showed about 8 minutes of low oxygen levels.  Please increase O2 to 4 lpm with BiPAP.  Send updated order to [employer].  Thank you.

## 2023-11-15 ENCOUNTER — HOSPITAL ENCOUNTER (OUTPATIENT)
Dept: NUCLEAR MEDICINE | Age: 73
Discharge: HOME | End: 2023-11-18
Payer: MEDICARE

## 2023-11-15 ENCOUNTER — HOSPITAL ENCOUNTER (OUTPATIENT)
Dept: GENERAL RADIOLOGY | Age: 73
Discharge: HOME | End: 2023-11-18
Payer: MEDICARE

## 2023-11-15 DIAGNOSIS — R06.09 DYSPNEA ON EXERTION: ICD-10-CM

## 2023-11-15 DIAGNOSIS — I27.20 PULMONARY HYPERTENSION (HCC): ICD-10-CM

## 2023-11-15 PROCEDURE — A9540 TC99M MAA: HCPCS | Performed by: NURSE PRACTITIONER

## 2023-11-15 PROCEDURE — A9539 TC99M PENTETATE: HCPCS | Performed by: NURSE PRACTITIONER

## 2023-11-15 PROCEDURE — 71046 X-RAY EXAM CHEST 2 VIEWS: CPT

## 2023-11-15 PROCEDURE — 3430000000 HC RX DIAGNOSTIC RADIOPHARMACEUTICAL: Performed by: NURSE PRACTITIONER

## 2023-11-15 PROCEDURE — 78582 LUNG VENTILAT&PERFUS IMAGING: CPT

## 2023-11-15 RX ORDER — KIT FOR THE PREPARATION OF TECHNETIUM TC 99M PENTETATE 20 MG/1
43.6 INJECTION, POWDER, LYOPHILIZED, FOR SOLUTION INTRAVENOUS; RESPIRATORY (INHALATION)
Status: COMPLETED | OUTPATIENT
Start: 2023-11-15 | End: 2023-11-15

## 2023-11-15 RX ADMIN — KIT FOR THE PREPARATION OF TECHNETIUM TC 99M PENTETATE 43.6 MILLICURIE: 20 INJECTION, POWDER, LYOPHILIZED, FOR SOLUTION INTRAVENOUS; RESPIRATORY (INHALATION) at 12:50

## 2023-11-15 RX ADMIN — KIT FOR THE PREPARATION OF TECHNETIUM TC 99M ALBUMIN AGGREGATED 6.6 MILLICURIE: 2.5 INJECTION, POWDER, FOR SOLUTION INTRAVENOUS at 13:30

## 2023-11-17 ENCOUNTER — TELEPHONE (OUTPATIENT)
Dept: PULMONOLOGY | Age: 73
End: 2023-11-17

## 2023-11-17 DIAGNOSIS — I27.20 PULMONARY HYPERTENSION (HCC): ICD-10-CM

## 2023-11-17 DIAGNOSIS — J45.30 MILD PERSISTENT ASTHMA WITHOUT COMPLICATION: ICD-10-CM

## 2023-11-17 DIAGNOSIS — R06.09 DYSPNEA ON EXERTION: Primary | ICD-10-CM

## 2023-12-29 ENCOUNTER — TELEPHONE (OUTPATIENT)
Dept: PULMONOLOGY | Age: 73
End: 2023-12-29

## 2023-12-29 RX ORDER — AZITHROMYCIN 250 MG/1
250 TABLET, FILM COATED ORAL SEE ADMIN INSTRUCTIONS
Qty: 6 TABLET | Refills: 0 | Status: SHIPPED | OUTPATIENT
Start: 2023-12-29 | End: 2024-01-03

## 2023-12-29 RX ORDER — PREDNISONE 20 MG/1
20 TABLET ORAL DAILY
Qty: 15 TABLET | Refills: 0 | Status: SHIPPED | OUTPATIENT
Start: 2023-12-29

## 2023-12-29 NOTE — TELEPHONE ENCOUNTER
TRIAGE CALL      Complaint: cough,congestion  Cough: yes  Productive:  yellow  Bloody Sputum:  no  Increased SOB/Wheezing:  wheezing  Duration: around the 12/24/23   Fever/Chills: no  OTC Meds tried: musinex      Patient says that she has been hurting in her back up around her shoulder blades patient is tired

## 2023-12-29 NOTE — TELEPHONE ENCOUNTER
Last seen: 11/8/23  Hx: BETANCOURT, PAH, asthma, nodule    Has heart cath planned for 1/4/24    Patient reporting increased coughing & congestion over the last week, sputum is yellow. Using nebulizer BID, occasionally more than that, slows coughing down for a short time. No edema, no fever. Sleeps w/ O2. Sats drop w/ exertion, but recovers quickly with rest. No sinus pressure or ears, some drainage in throat. Has had this happen before and steroid/antibiotic course was helpful. Confirmed local pharmacy.

## 2024-01-03 NOTE — PROGRESS NOTES
Patient pre-assessment complete for Darcy Plascencia scheduled for RHC, arrival time 0730. Patient verified using . Patient instructed to bring a list of all home medications on the day of procedure. NPO status reinforced. Patient instructed to HOLD Jardiance. Instructed they can take all other medications excluding vitamins & supplements. Patient verbalizes understanding of all instructions & denies any questions at this time.

## 2024-01-04 ENCOUNTER — HOSPITAL ENCOUNTER (OUTPATIENT)
Age: 74
Setting detail: OUTPATIENT SURGERY
Discharge: HOME | End: 2024-01-04
Attending: INTERNAL MEDICINE | Admitting: INTERNAL MEDICINE
Payer: MEDICARE

## 2024-01-04 VITALS
OXYGEN SATURATION: 95 % | BODY MASS INDEX: 40.85 KG/M2 | TEMPERATURE: 98 F | SYSTOLIC BLOOD PRESSURE: 149 MMHG | DIASTOLIC BLOOD PRESSURE: 59 MMHG | WEIGHT: 222 LBS | HEIGHT: 62 IN | HEART RATE: 52 BPM

## 2024-01-04 DIAGNOSIS — I27.20 PULMONARY HTN (HCC): ICD-10-CM

## 2024-01-04 LAB
ANION GAP SERPL CALC-SCNC: 4 MMOL/L (ref 2–11)
BUN SERPL-MCNC: 36 MG/DL (ref 8–23)
CALCIUM SERPL-MCNC: 9.9 MG/DL (ref 8.3–10.4)
CHLORIDE SERPL-SCNC: 105 MMOL/L (ref 103–113)
CO2 SERPL-SCNC: 29 MMOL/L (ref 21–32)
CREAT SERPL-MCNC: 1.4 MG/DL (ref 0.6–1)
ECHO BSA: 2.1 M2
EKG ATRIAL RATE: 50 BPM
EKG DIAGNOSIS: NORMAL
EKG P AXIS: 25 DEGREES
EKG P-R INTERVAL: 154 MS
EKG Q-T INTERVAL: 440 MS
EKG QRS DURATION: 88 MS
EKG QTC CALCULATION (BAZETT): 401 MS
EKG R AXIS: 44 DEGREES
EKG T AXIS: 80 DEGREES
EKG VENTRICULAR RATE: 50 BPM
ERYTHROCYTE [DISTWIDTH] IN BLOOD BY AUTOMATED COUNT: 13.5 % (ref 11.9–14.6)
GLUCOSE SERPL-MCNC: 188 MG/DL (ref 65–100)
HCT VFR BLD AUTO: 37.9 % (ref 35.8–46.3)
HGB BLD-MCNC: 11.8 G/DL (ref 11.7–15.4)
MAGNESIUM SERPL-MCNC: 2.8 MG/DL (ref 1.8–2.4)
MCH RBC QN AUTO: 28.7 PG (ref 26.1–32.9)
MCHC RBC AUTO-ENTMCNC: 31.1 G/DL (ref 31.4–35)
MCV RBC AUTO: 92.2 FL (ref 82–102)
NRBC # BLD: 0 K/UL (ref 0–0.2)
PLATELET # BLD AUTO: 306 K/UL (ref 150–450)
PMV BLD AUTO: 9.1 FL (ref 9.4–12.3)
POTASSIUM SERPL-SCNC: 4.4 MMOL/L (ref 3.5–5.1)
RBC # BLD AUTO: 4.11 M/UL (ref 4.05–5.2)
SODIUM SERPL-SCNC: 138 MMOL/L (ref 136–146)
WBC # BLD AUTO: 12.2 K/UL (ref 4.3–11.1)

## 2024-01-04 PROCEDURE — 93005 ELECTROCARDIOGRAM TRACING: CPT | Performed by: INTERNAL MEDICINE

## 2024-01-04 PROCEDURE — C1769 GUIDE WIRE: HCPCS | Performed by: INTERNAL MEDICINE

## 2024-01-04 PROCEDURE — 99152 MOD SED SAME PHYS/QHP 5/>YRS: CPT | Performed by: INTERNAL MEDICINE

## 2024-01-04 PROCEDURE — 2580000003 HC RX 258: Performed by: INTERNAL MEDICINE

## 2024-01-04 PROCEDURE — 93451 RIGHT HEART CATH: CPT | Performed by: INTERNAL MEDICINE

## 2024-01-04 PROCEDURE — 99214 OFFICE O/P EST MOD 30 MIN: CPT | Performed by: INTERNAL MEDICINE

## 2024-01-04 PROCEDURE — 83735 ASSAY OF MAGNESIUM: CPT

## 2024-01-04 PROCEDURE — 85027 COMPLETE CBC AUTOMATED: CPT

## 2024-01-04 PROCEDURE — C1894 INTRO/SHEATH, NON-LASER: HCPCS | Performed by: INTERNAL MEDICINE

## 2024-01-04 PROCEDURE — 80048 BASIC METABOLIC PNL TOTAL CA: CPT

## 2024-01-04 PROCEDURE — C1751 CATH, INF, PER/CENT/MIDLINE: HCPCS | Performed by: INTERNAL MEDICINE

## 2024-01-04 PROCEDURE — 6360000002 HC RX W HCPCS: Performed by: INTERNAL MEDICINE

## 2024-01-04 RX ORDER — HYDRALAZINE HYDROCHLORIDE 20 MG/ML
10 INJECTION INTRAMUSCULAR; INTRAVENOUS EVERY 10 MIN PRN
OUTPATIENT
Start: 2024-01-04

## 2024-01-04 RX ORDER — ATROPINE SULFATE 0.4 MG/ML
0.5 INJECTION, SOLUTION ENDOTRACHEAL; INTRAMEDULLARY; INTRAMUSCULAR; INTRAVENOUS; SUBCUTANEOUS
OUTPATIENT
Start: 2024-01-04 | End: 2024-01-05

## 2024-01-04 RX ORDER — SODIUM CHLORIDE 0.9 % (FLUSH) 0.9 %
5-40 SYRINGE (ML) INJECTION EVERY 12 HOURS SCHEDULED
OUTPATIENT
Start: 2024-01-04

## 2024-01-04 RX ORDER — 0.9 % SODIUM CHLORIDE 0.9 %
500 INTRAVENOUS SOLUTION INTRAVENOUS PRN
OUTPATIENT
Start: 2024-01-04

## 2024-01-04 RX ORDER — SODIUM CHLORIDE 9 MG/ML
INJECTION, SOLUTION INTRAVENOUS PRN
OUTPATIENT
Start: 2024-01-04

## 2024-01-04 RX ORDER — ACETAMINOPHEN 325 MG/1
650 TABLET ORAL EVERY 4 HOURS PRN
OUTPATIENT
Start: 2024-01-04

## 2024-01-04 RX ORDER — ASPIRIN 81 MG/1
324 TABLET, CHEWABLE ORAL ONCE
Status: DISCONTINUED | OUTPATIENT
Start: 2024-01-04 | End: 2024-01-04

## 2024-01-04 RX ORDER — MIDAZOLAM HYDROCHLORIDE 1 MG/ML
INJECTION INTRAMUSCULAR; INTRAVENOUS PRN
Status: DISCONTINUED | OUTPATIENT
Start: 2024-01-04 | End: 2024-01-04 | Stop reason: HOSPADM

## 2024-01-04 RX ORDER — SODIUM CHLORIDE 9 MG/ML
INJECTION, SOLUTION INTRAVENOUS CONTINUOUS
Status: DISCONTINUED | OUTPATIENT
Start: 2024-01-04 | End: 2024-01-04 | Stop reason: HOSPADM

## 2024-01-04 RX ORDER — MORPHINE SULFATE 10 MG/ML
2 INJECTION, SOLUTION INTRAMUSCULAR; INTRAVENOUS
OUTPATIENT
Start: 2024-01-04 | End: 2024-01-05

## 2024-01-04 RX ORDER — SODIUM CHLORIDE 0.9 % (FLUSH) 0.9 %
5-40 SYRINGE (ML) INJECTION PRN
OUTPATIENT
Start: 2024-01-04

## 2024-01-04 RX ADMIN — SODIUM CHLORIDE: 9 INJECTION, SOLUTION INTRAVENOUS at 08:01

## 2024-01-04 ASSESSMENT — ENCOUNTER SYMPTOMS
SHORTNESS OF BREATH: 1
ABDOMINAL PAIN: 0
BACK PAIN: 0
EYES NEGATIVE: 1
ALLERGIC/IMMUNOLOGIC NEGATIVE: 1
CHEST TIGHTNESS: 0
PHOTOPHOBIA: 0
EYE PAIN: 0
GASTROINTESTINAL NEGATIVE: 1

## 2024-01-04 NOTE — H&P
[facility] Cardiology Consult    Consult Cardiologist: Denis Huff MD     Primary Cardiologist: Mejia Laird MD    Primary Care Physician: Joel Conte     Subjective:     Darcy Plascencia is a 73 y.o.   presents with pulmonary HTN.   She follows with pulmonology who is undergoing an evaluation for pulmonary hypertension.  She has been scheduled for a right heart cath for a couple of months but had difficulty getting the procedure scheduled due to holiday.  She reports New York Heart Association class III-IV dyspnea symptoms.    Past Medical History:   Diagnosis Date    Arthritis     tylenol arthritis    Asthma     daily inhler; used rescue inhaler 1 month ago (Nov 2018)- last used neb 1 week ago    CAD (coronary artery disease)     MI- 3/2016- had 1 stent    Chronic pain     Crohn's disease (HCC)     Diabetes mellitus type 2, controlled ([facility])     type 2; avg fasting glucose- 100; last A1C= 6.0    GERD (gastroesophageal reflux disease)     controlled with esomeprazole    History of blood transfusion     s a child     History of pneumonia 04/2013    Pneumonia/asthma exacerbation, was on vent and transferred to [facility] for weaning; D/C home on oxygen.    HTN (hypertension)     controlled with med    Hypercholesteremia     statin taken at night    Morbid obesity (HCC)     Psychiatric disorder     depression    Sleep apnea     uses BIPAP w 02 @ 3 l/min at HS      Past Surgical History:   Procedure Laterality Date    CARDIAC CATHETERIZATION  03/01/2016    stent x1    COLONOSCOPY      COLONOSCOPY N/A 6/2/2022    COLONOSCOPY POLYPECTOMY AND COLD BIOPSY performed by Edna Rosas MD at [facility] ENDOSCOPY    ENDOSCOPY, COLON, DIAGNOSTIC      HEENT      Sinus sx    NEUROLOGICAL SURGERY  12/04/2018    ENMA X 1    UPPER GASTROINTESTINAL ENDOSCOPY N/A 6/2/2022    EGD BIOPSY performed by Edna Rosas MD at [facility] ENDOSCOPY      Current Facility-Administered Medications   Medication Dose Route Frequency    0.9 % sodium chloride infusion

## 2024-01-04 NOTE — DISCHARGE INSTRUCTIONS
HEART CATHETERIZATION/ANGIOGRAPHY DISCHARGE INSTRUCTIONS    Check puncture site frequently for swelling or bleeding. If there is any bleeding, apply pressure over the area with a clean towel or washcloth and call 911. Notify your doctor for any redness, swelling, drainage, or oozing from the puncture site. Notify your doctor for any fever or chills.  If the extremity becomes cold, numb, or painful call [facility] Cardiology @ [phone].  Activity should be limited for the next 48 hours. No heavy lifting, pushing, pulling  or strenuous activity for 48 hours. No heavy lifting (anything over 10 pounds) for 3 days.  You may resume your usual diet. Drink more fluids than usual.  Have a responsible person drive you home and stay with you for at least 24 hours after your heart catheterization/angiography.  You may remove bandage from your R arm in 24 hours. You may shower in 24 hours. No tub baths, hot tubs, or swimming for 1 week. Do not place any lotions, creams, powders, or ointments over puncture site for 1 week. You may place a clean band-aid over the puncture site each day for 5 days. Change daily.        Sedation for a Medical Procedure: Care Instructions     You were given a sedative medication during your visit. While many of the effects will have worn   off before you leave; you may continue to feel some effects for several hours.      Common side effects from sedation include:  Feeling sleepy. (Your doctors and nurses will make sure you are not too sleepy to go home.)  Nausea and vomiting. This usually does not last long.  Feeling tired.     How can you care for yourself at home?  Activity    Don't do anything for 24 hours that requires attention to detail. It takes time for the medicine effects to completely wear off.     Do not make important legal decisions for 24 hours.     Do not sign any legal documents for 24 hours.     Do not drink alcohol today     For your safety, you should not drive or operate heavy

## 2024-01-04 NOTE — PROGRESS NOTES
Report received from Yanelis, Cath Lab RN. Procedural finding communicated. Intra procedural medication administration reviewed. Progression of care discussed.    Patient received into CPRU room 5, Post RHC w/ Dr Huff    Access site without bleeding or swelling. None noted    Patient instructed to limit movement of R upper extremity.    Routine post procedural vital signs & site assessment initiated.

## 2024-01-04 NOTE — PROGRESS NOTES
[facility] w/ Dr. Huff  R brachial access  7fr sheath removed, manual pressure held, covered w/ perssure bandage  No s/sxs of bleeding or hematoma to R brachial access site    Versed 1mg IV    TRANSFER - OUT REPORT:    Verbal report given to ERNST Mcconnell on Darcy Plascencia  being transferred to CPRU for routine progression of patient care       Report consisted of patient's Situation, Background, Assessment and   Recommendations(SBAR).     Information from the following report(s) Nurse Handoff Report and MAR was reviewed with the receiving nurse.    Opportunity for questions and clarification was provided.      Patient transported with:  Tech

## 2024-01-04 NOTE — PROGRESS NOTES
Discharge instructions given per orders, voiced good understanding of post RHC care, medications & follow up care. Denies any questions

## 2024-01-04 NOTE — PROGRESS NOTES
Patient received to CPRU room # 14  Ambulatory from [facility]. Patient scheduled for RHC today with Dr Huff. Procedure reviewed & questions answered, voiced good understanding consent obtained & placed on chart. All medications and medical history reviewed. Will prep patient per orders. Patient & family updated on plan of care.      The patient has a fraility score of 4-MANAGING WELL, based on patient A&Ox3, patient requires O2 during exertion.

## 2024-01-25 ENCOUNTER — OFFICE VISIT (OUTPATIENT)
Dept: PULMONOLOGY | Age: 74
End: 2024-01-25
Payer: MEDICARE

## 2024-01-25 VITALS
OXYGEN SATURATION: 93 % | HEART RATE: 58 BPM | SYSTOLIC BLOOD PRESSURE: 108 MMHG | HEIGHT: 62 IN | WEIGHT: 217 LBS | DIASTOLIC BLOOD PRESSURE: 64 MMHG | RESPIRATION RATE: 16 BRPM | BODY MASS INDEX: 39.93 KG/M2 | TEMPERATURE: 97.3 F

## 2024-01-25 DIAGNOSIS — J98.4 RESTRICTIVE LUNG DISEASE: ICD-10-CM

## 2024-01-25 DIAGNOSIS — J84.9 INTERSTITIAL LUNG DISEASE (HCC): Primary | ICD-10-CM

## 2024-01-25 DIAGNOSIS — I27.20 PULMONARY HYPERTENSION (HCC): ICD-10-CM

## 2024-01-25 PROCEDURE — 3078F DIAST BP <80 MM HG: CPT | Performed by: INTERNAL MEDICINE

## 2024-01-25 PROCEDURE — G8417 CALC BMI ABV UP PARAM F/U: HCPCS | Performed by: INTERNAL MEDICINE

## 2024-01-25 PROCEDURE — 1123F ACP DISCUSS/DSCN MKR DOCD: CPT | Performed by: INTERNAL MEDICINE

## 2024-01-25 PROCEDURE — 1090F PRES/ABSN URINE INCON ASSESS: CPT | Performed by: INTERNAL MEDICINE

## 2024-01-25 PROCEDURE — G8484 FLU IMMUNIZE NO ADMIN: HCPCS | Performed by: INTERNAL MEDICINE

## 2024-01-25 PROCEDURE — G8427 DOCREV CUR MEDS BY ELIG CLIN: HCPCS | Performed by: INTERNAL MEDICINE

## 2024-01-25 PROCEDURE — 3017F COLORECTAL CA SCREEN DOC REV: CPT | Performed by: INTERNAL MEDICINE

## 2024-01-25 PROCEDURE — 1036F TOBACCO NON-USER: CPT | Performed by: INTERNAL MEDICINE

## 2024-01-25 PROCEDURE — 99215 OFFICE O/P EST HI 40 MIN: CPT | Performed by: INTERNAL MEDICINE

## 2024-01-25 PROCEDURE — G8399 PT W/DXA RESULTS DOCUMENT: HCPCS | Performed by: INTERNAL MEDICINE

## 2024-01-25 PROCEDURE — 3074F SYST BP LT 130 MM HG: CPT | Performed by: INTERNAL MEDICINE

## 2024-01-25 RX ORDER — ASCORBIC ACID 500 MG
500 TABLET ORAL DAILY
COMMUNITY

## 2024-01-25 RX ORDER — AMLODIPINE BESYLATE 2.5 MG/1
2.5 TABLET ORAL DAILY
COMMUNITY
Start: 2023-09-20

## 2024-01-25 NOTE — PROGRESS NOTES
Patient Name:  Darcy Plascencia                               YOB: 1950  MRN: [id number]                                                Office Visit 1/24/2024    ASSESSMENT AND PLAN:  (Medical Decision Making)      There are no diagnoses linked to this encounter.  No orders of the defined types were placed in this encounter.    No orders of the defined types were placed in this encounter.      Jo Ann Valdez MA    Total time for encounter on day of encounter was *** minutes.  This time includes chart prep, review of tests/procedures, review of other provider's notes, documentation and counseling patient regarding disease process and medications.    ___________________________________________________________________         ______      REASON FOR VISIT:   No chief complaint on file.      HISTORY OF PRESENT ILLNESS:    Ms. Darcy Plascencia is a 73 y.o. female with a PMH of  *** who is seen at [facility] for  ***      Tobacco Use      Smoking status: Never      Smokeless tobacco: Never    Second Hand Smoke Exposure: {YES/NO:19726::\"No\"}  Birds: {YES/NO:19726::\"No\"}  Asbestos: {YES/NO:19726::\"No\"}  TB: {YES/NO:19726::\"No\"}  Hot Tubs/Humidifier: {YES/NO:19726::\"No\"}  Organic/Inorganic Dusts: {YES/NO:19726::\"No\"}  Molds: {YES/NO:19726::\"No\"}  Occupation/Hobbies: ***    REVIEW OF SYSTEMS:   10 point review of systems is negative except as reported in HPI.    PHYSICAL EXAM:   There were no vitals filed for this visit.  There is no height or weight on file to calculate BMI.      General:   Alert, cooperative, no distress, appears stated age.        Eyes/Ears/Nose:   Conjunctivae/corneas clear. PERRL. Nasal mucosa is normal.  Normal TMs and external auditory canals.        Mouth/Throat:  Lips, mucosa, and tongue normal. Teeth and gums normal.        Lungs:     ***     Heart:   Regular rate and rhythm, S1, S2 normal, no murmur, click, rub or gallop.     Abdomen:    Soft, non-tender.     
  Diagnostic Review:    TTE 9/24/21 ([facility]) 2/27/23   LV EF: 60-65%  Normal diastolic function EF: 60-65%  Abnormal diastolic function.    RV Normal size and function Normal size and function   Peak TRV -- 3.76 m/s   RVSP 50 mmHg 60 mmHg   COMMENTS Mild to mod TV regurg LA mildly dilated; mild MV regurg; mod to severe TV regurg       Right Heart Cath 1/4/24   RA 10/7 (4)   RV 45/0 (8)   PA 54/31 (39)   PCWP 10   CO 4.5 L   PVR 6.44 huff   VASOREACTIVE?      Lab Diagnostics:  HIV: 11/8/23 neg  HCV: 11/8/23 neg  WILLIAMS:  11/8/23 neg  BNP   DDIMER                      
is  37 ml/m2.    Technical qualifiers: Color flow Doppler was performed and pulse wave and/or continuous wave Doppler was performed.    Signed by: Saravanan Lawson MD on 2/28/2023  6:24 AM, Signed by: Unknown Provider Result on 2/28/2023 12:00 AM     SPLIT NIGHT SLEEP STUDY--5/24/2021      REFERENCE INFO:                                                                                                                            Past Medical History:   Diagnosis Date    Arthritis     tylenol arthritis    Asthma     daily inhler; used rescue inhaler 1 month ago (Nov 2018)- last used neb 1 week ago    CAD (coronary artery disease)     MI- 3/2016- had 1 stent    Chronic pain     Crohn's disease ([facility])     Diabetes mellitus type 2, controlled ([facility])     type 2; avg fasting glucose- 100; last A1C= 6.0    GERD (gastroesophageal reflux disease)     controlled with esomeprazole    History of blood transfusion     s a child     History of pneumonia 04/2013    Pneumonia/asthma exacerbation, was on vent and transferred to [facility] for weaning; D/C home on oxygen.    HTN (hypertension)     controlled with med    Hypercholesteremia     statin taken at night    Morbid obesity ([facility])     Psychiatric disorder     depression    Sleep apnea     uses BIPAP w 02 @ 3 l/min at HS     Allergies   Allergen Reactions    Latex Rash    Lidocaine Anaphylaxis     Allergic to all \"KERRIE\"    Propofol Other (See Comments)     Paralysis    Penicillins Other (See Comments)    Ceftriaxone Rash    Cephalosporins Rash    Morphine Rash    Pregabalin Rash     Current Outpatient Medications   Medication Instructions    Acetaminophen (TYLENOL 8 HOUR ARTHRITIS PAIN PO) Oral    albuterol (PROVENTIL) 2.5 mg, Nebulization, EVERY 4 HOURS PRN, Asthma DX j45.9 pulmonary hypertension .20  hypoxia dx r09.02    amLODIPine (NORVASC) 2.5 mg, Oral, DAILY    ASHWAGANDHA PO Oral    aspirin 81 MG EC tablet Oral, EVERY EVENING    atorvastatin (LIPITOR) 20 mg, Oral,

## 2024-01-29 ENCOUNTER — HOSPITAL ENCOUNTER (OUTPATIENT)
Dept: CT IMAGING | Age: 74
Discharge: HOME | End: 2024-02-01
Attending: INTERNAL MEDICINE
Payer: MEDICARE

## 2024-01-29 DIAGNOSIS — J84.10 PULMONARY FIBROSIS (HCC): ICD-10-CM

## 2024-01-29 DIAGNOSIS — M07.60 PERIPHERAL ARTHROPATHY DUE TO ULCERATIVE COLITIS (HCC): ICD-10-CM

## 2024-01-29 DIAGNOSIS — Z99.81 ON HOME O2: ICD-10-CM

## 2024-01-29 DIAGNOSIS — K51.90 PERIPHERAL ARTHROPATHY DUE TO ULCERATIVE COLITIS (HCC): ICD-10-CM

## 2024-01-29 DIAGNOSIS — Z86.010 HX OF COLONIC POLYPS: ICD-10-CM

## 2024-01-29 DIAGNOSIS — K21.9 CHALASIA OF LOWER ESOPHAGEAL SPHINCTER: ICD-10-CM

## 2024-01-29 DIAGNOSIS — J84.9 INTERSTITIAL LUNG DISEASE (HCC): ICD-10-CM

## 2024-01-29 PROCEDURE — 71250 CT THORAX DX C-: CPT

## 2024-01-29 PROCEDURE — 74176 CT ABD & PELVIS W/O CONTRAST: CPT

## 2024-01-29 PROCEDURE — 6360000004 HC RX CONTRAST MEDICATION: Performed by: INTERNAL MEDICINE

## 2024-01-29 RX ADMIN — DIATRIZOATE MEGLUMINE AND DIATRIZOATE SODIUM 15 ML: 660; 100 LIQUID ORAL; RECTAL at 16:44

## 2024-02-13 ENCOUNTER — TELEPHONE (OUTPATIENT)
Dept: PULMONOLOGY | Age: 74
End: 2024-02-13

## 2024-02-13 NOTE — TELEPHONE ENCOUNTER
----- Message from Ana Sousa MD sent at 2/7/2024  8:47 AM EST -----  Please let Mrs. Plascencia know that there is still some scarring in both lungs, but that it doesn't seem to have progressed since 2020.  Further workup can be considered at your next visit (?BAL), and when trying to understand why the scarring is there, we should consider the possible extrapulmonary manifestations of IBD as well as drug toxicities from medications for UC that may have been used in past (sulfasalazine?, mesalamine?).  Please remind patient to get blood work to have Sjogrens Abs tested as well.

## 2024-02-14 ENCOUNTER — OFFICE VISIT (OUTPATIENT)
Dept: SURGERY | Age: 74
End: 2024-02-14
Payer: MEDICARE

## 2024-02-14 ENCOUNTER — PREP FOR PROCEDURE (OUTPATIENT)
Dept: SURGERY | Age: 74
End: 2024-02-14

## 2024-02-14 VITALS
HEIGHT: 62 IN | SYSTOLIC BLOOD PRESSURE: 122 MMHG | DIASTOLIC BLOOD PRESSURE: 80 MMHG | HEART RATE: 58 BPM | BODY MASS INDEX: 40.85 KG/M2 | WEIGHT: 222 LBS | OXYGEN SATURATION: 96 %

## 2024-02-14 DIAGNOSIS — K42.9 UMBILICAL HERNIA WITHOUT OBSTRUCTION OR GANGRENE: ICD-10-CM

## 2024-02-14 DIAGNOSIS — K42.0 INCARCERATED UMBILICAL HERNIA: Primary | ICD-10-CM

## 2024-02-14 PROCEDURE — 3074F SYST BP LT 130 MM HG: CPT | Performed by: SURGERY

## 2024-02-14 PROCEDURE — G8427 DOCREV CUR MEDS BY ELIG CLIN: HCPCS | Performed by: SURGERY

## 2024-02-14 PROCEDURE — G8399 PT W/DXA RESULTS DOCUMENT: HCPCS | Performed by: SURGERY

## 2024-02-14 PROCEDURE — 1123F ACP DISCUSS/DSCN MKR DOCD: CPT | Performed by: SURGERY

## 2024-02-14 PROCEDURE — G8417 CALC BMI ABV UP PARAM F/U: HCPCS | Performed by: SURGERY

## 2024-02-14 PROCEDURE — 1090F PRES/ABSN URINE INCON ASSESS: CPT | Performed by: SURGERY

## 2024-02-14 PROCEDURE — 1036F TOBACCO NON-USER: CPT | Performed by: SURGERY

## 2024-02-14 PROCEDURE — 3079F DIAST BP 80-89 MM HG: CPT | Performed by: SURGERY

## 2024-02-14 PROCEDURE — 99205 OFFICE O/P NEW HI 60 MIN: CPT | Performed by: SURGERY

## 2024-02-14 PROCEDURE — 3017F COLORECTAL CA SCREEN DOC REV: CPT | Performed by: SURGERY

## 2024-02-14 PROCEDURE — G8484 FLU IMMUNIZE NO ADMIN: HCPCS | Performed by: SURGERY

## 2024-02-14 RX ORDER — GLIMEPIRIDE 2 MG/1
2 TABLET ORAL DAILY PRN
COMMUNITY
Start: 2024-02-13 | End: 2024-03-14

## 2024-02-14 NOTE — PROGRESS NOTES
exacerbation, progression, or side effects of treatment;    or  ?2or more stable chronic illnesses;    or  ?1undiagnosed new problem with uncertain prognosis;    or  ?1acute illness with systemic symptoms;    or  ?1acute complicated injury   Moderate  (Must meet the requirements of at least 1 out of 3 categories)  Category 1: Tests, documents, or independent historian(s)  ?Any combination of 3 from the following:   ?Review of prior external note(s) from each unique source*;  ?Review of the result(s) of each unique test*;  ?Ordering of each unique test*;  ?Assessment requiring an independent historian(s)    or  Category 2: Independent interpretation of tests   ?Independent interpretation of a test performed by another physician/other qualified health care professional (not separately reported);     or  Category 3: Discussion of management or test interpretation  ?Discussion of management or test interpretation with external physician/other qualified health care professional/appropriate source (not separately reported)   Moderate risk of morbidity from additional diagnostic testing or treatment  Examples only:  ?Prescription drug management   ?Decision regarding minor surgery with identified patient or procedure risk factors  ?Decision regarding elective major surgery without identified patient or procedure risk factors   ?Diagnosis or treatment significantly limited by social determinants of health       44669  49863 High High  ?1or more chronic illnesses with severe exacerbation, progression, or side effects of treatment;    or  ?1 acute or chronic illness or injury that poses a threat to life or bodily function--umbilical hernia with morbid obesity progression to BMI greater than 40 and incarceration of the   Extensive  (Must meet the requirements of at least 2 out of 3 categories)  Category 1: Tests, documents, or independent historian(s)  ?Any combination of 3 from the following:   ?Review of prior external

## 2024-02-20 RX ORDER — SODIUM CHLORIDE 0.9 % (FLUSH) 0.9 %
5-40 SYRINGE (ML) INJECTION PRN
Status: CANCELLED | OUTPATIENT
Start: 2024-02-20

## 2024-02-20 RX ORDER — SODIUM CHLORIDE 0.9 % (FLUSH) 0.9 %
5-40 SYRINGE (ML) INJECTION EVERY 12 HOURS SCHEDULED
Status: CANCELLED | OUTPATIENT
Start: 2024-02-20

## 2024-02-20 RX ORDER — SODIUM CHLORIDE 9 MG/ML
INJECTION, SOLUTION INTRAVENOUS PRN
Status: CANCELLED | OUTPATIENT
Start: 2024-02-20

## 2024-02-21 ENCOUNTER — TELEPHONE (OUTPATIENT)
Dept: SLEEP MEDICINE | Age: 74
End: 2024-02-21

## 2024-02-21 NOTE — PROGRESS NOTES
[facility]  [address]  [place] [address]  [phone]    Patient Name:  Darcy Plascencia  YOB: 1950      Office Visit 2/22/2024    CHIEF COMPLAINT:    Chief Complaint   Patient presents with    Sleep Apnea         HISTORY OF PRESENT ILLNESS:  Patient is a 72 yo female seen today for follow up of NEFTALI.  Diagnostic sleep study on 04/01/2003 with an AHI of 19.8 and desaturations to 78%. She is prescribed bipap therapy with a humidifier set at 14/9 cm with 3 L oxygen bleed in with a full face mask. Most recent download reveals AHI on PAP therapy is 0.8, leak is median 0.6 and 18.8 at 95th percentile and the hourly usage is 9 hours 54 minutes nightly. The overall use is 1821 hours with days greater than four hours at 184/184. The patient is compliant with the Pap therapy and is feeling better as a result.  She has excellent compliance with BiPAP.  Since her last visit she did restart taking doxepin 10 mg nightly to help with sleep initiation and maintenance.  She reports that this is working very well.  Her granddaughter is with her today to help with HPI.  She reports that she is sleeping more consistently through the night.  She also reports that she feels that when her grandmother was on the iron consistently she did not complain about her legs as much and seemed to have more energy.  States that she ran out of the iron prescription a couple months ago.  Her ferritin was low at last check.  We will renew her iron prescription today as well.  She did complete an overnight oximetry since her last visit which showed 7 minutes and 52 seconds of oxygen saturations less than 88%.  I did advise her to increase her oxygen at night with her BiPAP to 4 L.  She denies any major medical changes since her last visit.  Reports that her weight has consistently been around 220 pounds.  Her blood pressure is well-controlled today.        Download        [place] Sleepiness Scale

## 2024-02-22 ENCOUNTER — OFFICE VISIT (OUTPATIENT)
Dept: SLEEP MEDICINE | Age: 74
End: 2024-02-22
Payer: MEDICARE

## 2024-02-22 VITALS
SYSTOLIC BLOOD PRESSURE: 116 MMHG | HEIGHT: 62 IN | DIASTOLIC BLOOD PRESSURE: 63 MMHG | HEART RATE: 57 BPM | BODY MASS INDEX: 40.85 KG/M2 | OXYGEN SATURATION: 97 % | TEMPERATURE: 97.1 F | WEIGHT: 222 LBS

## 2024-02-22 DIAGNOSIS — G47.00 PERSISTENT DISORDER OF INITIATING OR MAINTAINING SLEEP: ICD-10-CM

## 2024-02-22 DIAGNOSIS — G47.34 NOCTURNAL HYPOXEMIA: ICD-10-CM

## 2024-02-22 DIAGNOSIS — E66.01 CLASS 3 SEVERE OBESITY DUE TO EXCESS CALORIES WITHOUT SERIOUS COMORBIDITY WITH BODY MASS INDEX (BMI) OF 40.0 TO 44.9 IN ADULT (HCC): ICD-10-CM

## 2024-02-22 DIAGNOSIS — G47.33 OSA (OBSTRUCTIVE SLEEP APNEA): Primary | ICD-10-CM

## 2024-02-22 DIAGNOSIS — G25.81 RLS (RESTLESS LEGS SYNDROME): ICD-10-CM

## 2024-02-22 PROBLEM — E66.813 CLASS 3 SEVERE OBESITY DUE TO EXCESS CALORIES WITHOUT SERIOUS COMORBIDITY WITH BODY MASS INDEX (BMI) OF 40.0 TO 44.9 IN ADULT: Status: ACTIVE | Noted: 2024-02-22

## 2024-02-22 PROCEDURE — 99213 OFFICE O/P EST LOW 20 MIN: CPT | Performed by: NURSE PRACTITIONER

## 2024-02-22 PROCEDURE — 1123F ACP DISCUSS/DSCN MKR DOCD: CPT | Performed by: NURSE PRACTITIONER

## 2024-02-22 PROCEDURE — 3017F COLORECTAL CA SCREEN DOC REV: CPT | Performed by: NURSE PRACTITIONER

## 2024-02-22 PROCEDURE — G8417 CALC BMI ABV UP PARAM F/U: HCPCS | Performed by: NURSE PRACTITIONER

## 2024-02-22 PROCEDURE — 3074F SYST BP LT 130 MM HG: CPT | Performed by: NURSE PRACTITIONER

## 2024-02-22 PROCEDURE — 1090F PRES/ABSN URINE INCON ASSESS: CPT | Performed by: NURSE PRACTITIONER

## 2024-02-22 PROCEDURE — G8427 DOCREV CUR MEDS BY ELIG CLIN: HCPCS | Performed by: NURSE PRACTITIONER

## 2024-02-22 PROCEDURE — 3078F DIAST BP <80 MM HG: CPT | Performed by: NURSE PRACTITIONER

## 2024-02-22 PROCEDURE — G8399 PT W/DXA RESULTS DOCUMENT: HCPCS | Performed by: NURSE PRACTITIONER

## 2024-02-22 PROCEDURE — 1036F TOBACCO NON-USER: CPT | Performed by: NURSE PRACTITIONER

## 2024-02-22 PROCEDURE — G8484 FLU IMMUNIZE NO ADMIN: HCPCS | Performed by: NURSE PRACTITIONER

## 2024-02-22 RX ORDER — FERROUS SULFATE 325(65) MG
325 TABLET ORAL 2 TIMES DAILY
Qty: 60 TABLET | Refills: 3 | Status: SHIPPED | OUTPATIENT
Start: 2024-02-22

## 2024-02-22 RX ORDER — DOXEPIN HYDROCHLORIDE 10 MG/1
10 CAPSULE ORAL NIGHTLY
Qty: 30 CAPSULE | Refills: 5 | Status: SHIPPED | OUTPATIENT
Start: 2024-02-22

## 2024-02-22 ASSESSMENT — SLEEP AND FATIGUE QUESTIONNAIRES
HOW LIKELY ARE YOU TO NOD OFF OR FALL ASLEEP WHILE WATCHING TV: 3
ESS TOTAL SCORE: 12
HOW LIKELY ARE YOU TO NOD OFF OR FALL ASLEEP WHILE SITTING AND READING: 3
HOW LIKELY ARE YOU TO NOD OFF OR FALL ASLEEP WHILE SITTING INACTIVE IN A PUBLIC PLACE: 0
HOW LIKELY ARE YOU TO NOD OFF OR FALL ASLEEP WHEN YOU ARE A PASSENGER IN A CAR FOR AN HOUR WITHOUT A BREAK: 0
HOW LIKELY ARE YOU TO NOD OFF OR FALL ASLEEP WHILE LYING DOWN TO REST IN THE AFTERNOON WHEN CIRCUMSTANCES PERMIT: 3
HOW LIKELY ARE YOU TO NOD OFF OR FALL ASLEEP WHILE SITTING QUIETLY AFTER LUNCH WITHOUT ALCOHOL: 3
HOW LIKELY ARE YOU TO NOD OFF OR FALL ASLEEP WHILE SITTING AND TALKING TO SOMEONE: 0
HOW LIKELY ARE YOU TO NOD OFF OR FALL ASLEEP IN A CAR, WHILE STOPPED FOR A FEW MINUTES IN TRAFFIC: 0

## 2024-02-22 NOTE — PATIENT INSTRUCTIONS
Continue BiPAP 14/9 cm H2O with 4 L oxygen bleed in with nightly compliance  New supplies ordered  Recommendations as above  Follow-up in 6 months or sooner if needed

## 2024-02-25 DIAGNOSIS — I10 HTN (HYPERTENSION): ICD-10-CM

## 2024-02-25 RX ORDER — CLONIDINE HYDROCHLORIDE 0.1 MG/1
TABLET ORAL
Qty: 180 TABLET | Refills: 3 | Status: SHIPPED | OUTPATIENT
Start: 2024-02-25

## 2024-02-26 ENCOUNTER — OFFICE VISIT (OUTPATIENT)
Age: 74
End: 2024-02-26
Payer: MEDICARE

## 2024-02-26 VITALS
HEIGHT: 62 IN | HEART RATE: 60 BPM | DIASTOLIC BLOOD PRESSURE: 68 MMHG | WEIGHT: 224 LBS | BODY MASS INDEX: 41.22 KG/M2 | SYSTOLIC BLOOD PRESSURE: 120 MMHG

## 2024-02-26 DIAGNOSIS — Z01.818 PRE-OP EVALUATION: ICD-10-CM

## 2024-02-26 DIAGNOSIS — I10 PRIMARY HYPERTENSION: Primary | ICD-10-CM

## 2024-02-26 DIAGNOSIS — I25.10 ASCVD (ARTERIOSCLEROTIC CARDIOVASCULAR DISEASE): ICD-10-CM

## 2024-02-26 PROCEDURE — 1090F PRES/ABSN URINE INCON ASSESS: CPT | Performed by: INTERNAL MEDICINE

## 2024-02-26 PROCEDURE — 99214 OFFICE O/P EST MOD 30 MIN: CPT | Performed by: INTERNAL MEDICINE

## 2024-02-26 PROCEDURE — 3074F SYST BP LT 130 MM HG: CPT | Performed by: INTERNAL MEDICINE

## 2024-02-26 PROCEDURE — G8417 CALC BMI ABV UP PARAM F/U: HCPCS | Performed by: INTERNAL MEDICINE

## 2024-02-26 PROCEDURE — 1036F TOBACCO NON-USER: CPT | Performed by: INTERNAL MEDICINE

## 2024-02-26 PROCEDURE — G8484 FLU IMMUNIZE NO ADMIN: HCPCS | Performed by: INTERNAL MEDICINE

## 2024-02-26 PROCEDURE — 3017F COLORECTAL CA SCREEN DOC REV: CPT | Performed by: INTERNAL MEDICINE

## 2024-02-26 PROCEDURE — 1123F ACP DISCUSS/DSCN MKR DOCD: CPT | Performed by: INTERNAL MEDICINE

## 2024-02-26 PROCEDURE — G8427 DOCREV CUR MEDS BY ELIG CLIN: HCPCS | Performed by: INTERNAL MEDICINE

## 2024-02-26 PROCEDURE — 3078F DIAST BP <80 MM HG: CPT | Performed by: INTERNAL MEDICINE

## 2024-02-26 PROCEDURE — G8399 PT W/DXA RESULTS DOCUMENT: HCPCS | Performed by: INTERNAL MEDICINE

## 2024-02-26 NOTE — PROGRESS NOTES
[facility] CARDIOLOGY  [address] [place] [address], [facility] [address]  [place] [address]  PHONE: [phone]        24        NAME:  Darcy Plascencia  : 1950  MRN: [id number]     1. Crohn's disease of small intestine without complication (HCC)  2. Essential hypertension  3. Atherosclerosis of native coronary artery of native heart without angina pectoris  4. Mild intermittent asthma without complication  5. Type 2 diabetes mellitus without complication (HCC)  6. Obstructive sleep apnea  7. ILD (interstitial lung disease) (HCC)  8. Bronchiectasis without complication (HCC)  9. Gastroesophageal reflux disease with esophagitis  10. Other chronic pain  11. Other depression  12: HTN  13: CKD    CHIEF COMPLAINT:    Coronary Artery Disease      SUBJECTIVE:     She is eddy for an umbilical hernia repair 3/5.  No chest pain or palpitation or dizziness.       Medications were all reviewed with the patient today and updated as necessary.   Current Outpatient Medications   Medication Sig    cloNIDine (CATAPRES) 0.1 MG tablet TAKE ONE TABLET BY MOUTH TWICE A DAY    ferrous sulfate (IRON 325) 325 (65 Fe) MG tablet Take 1 tablet by mouth 2 times daily Start ferrous sulfate 325 mg one tablet BID x 1 month, then one tablet daily. Take ferrous sulfate with Vit C to aid in absorption. Ferrous sulfate can cause constipation and a stool softener may be needed.    doxepin (SINEQUAN) 10 MG capsule Take 1 capsule by mouth nightly    glimepiride (AMARYL) 2 MG tablet Take 1 tablet by mouth daily as needed    amLODIPine (NORVASC) 2.5 MG tablet Take 1 tablet by mouth daily    vitamin C (ASCORBIC ACID) 500 MG tablet Take 1 tablet by mouth daily    ASHWAGANDHA PO Take by mouth    Acetaminophen (TYLENOL 8 HOUR ARTHRITIS PAIN PO) Take by mouth    benazepril (LOTENSIN) 40 MG tablet TAKE ONE TABLET BY MOUTH ONE TIME DAILY    labetalol (NORMODYNE) 200 MG tablet TAKE ONE TABLET BY MOUTH TWICE A DAY    mometasone-formoterol (DULERA) 100-5 MCG/ACT

## 2024-02-28 NOTE — PERIOP NOTE
Patient verified name and .  Order for consent  found in EHR and matches case posting; patient verifies procedure.     Type 2 surgery, Phone assessment complete.  Orders  received.  Labs per surgeon: none  Labs per anesthesia protocol: POC glucose, Potassium, Creatinine, and Hgb DOS.    Chart flagged for anesthesia review: Pt with allergy to Propofol, pulmonary HTN, Interstitual lung disease. pt  and granddaughter state pt was admitted and placed on ventilator for 2 weeks due to severe asthma exacerbation in . Pt experienced severe muscles weakness and was sent to rehab after discharge, which was assumed to be related to the administrations of Propofol.    Patient answered medical/surgical history questions at their best of ability. All prior to admission medications documented in EPIC.  Patient instructed to take the following medications the day of surgery according to anesthesia guidelines with a small sip of water: Dulera inhaler, Labetalol, Nexium, Tylenol, Norvasc, and Catapres.  Hold all vitamins 7 days prior to surgery and NSAIDS 5 days prior to surgery. Prescription meds to hold: Vitamin C, Vitamin B12, Aswagandha, Oscal, and Voltaren gel.  Pt and granddaughter state pt has been instructed by her surgeons office to hold Jardiance 3 days prior to procedure.    Patient instructed on the following:    > Arrive at Main Entrance, time of arrival to be called the day before by 1700  > NPO after midnight, unless otherwise indicated, including gum, mints, and ice chips  > Responsible adult must drive patient to the hospital, stay during surgery, and patient will need supervision 24 hours after anesthesia  > Use non moisturizing soap in shower the night before surgery and on the morning of surgery  > All piercings must be removed prior to arrival.    > Leave all valuables (money and jewelry) at home but bring insurance card and ID on DOS.   > You may be required to pay a deductible or co-pay on the day of

## 2024-03-04 ENCOUNTER — ANESTHESIA EVENT (OUTPATIENT)
Dept: SURGERY | Age: 74
End: 2024-03-04
Payer: MEDICARE

## 2024-03-05 ENCOUNTER — HOSPITAL ENCOUNTER (OUTPATIENT)
Age: 74
Setting detail: OUTPATIENT SURGERY
Discharge: HOME | End: 2024-03-05
Attending: SURGERY | Admitting: SURGERY
Payer: MEDICARE

## 2024-03-05 ENCOUNTER — ANESTHESIA (OUTPATIENT)
Dept: SURGERY | Age: 74
End: 2024-03-05
Payer: MEDICARE

## 2024-03-05 VITALS
HEIGHT: 62 IN | BODY MASS INDEX: 41.04 KG/M2 | TEMPERATURE: 97.7 F | RESPIRATION RATE: 16 BRPM | OXYGEN SATURATION: 92 % | SYSTOLIC BLOOD PRESSURE: 147 MMHG | WEIGHT: 223 LBS | HEART RATE: 52 BPM | DIASTOLIC BLOOD PRESSURE: 64 MMHG

## 2024-03-05 DIAGNOSIS — K42.0 INCARCERATED UMBILICAL HERNIA: Primary | ICD-10-CM

## 2024-03-05 LAB
CREAT BLD-MCNC: 1.3 MG/DL (ref 0.8–1.5)
GLUCOSE BLD STRIP.AUTO-MCNC: 143 MG/DL (ref 65–100)
HGB BLD-MCNC: 11.7 G/DL (ref 11.7–15.4)
POTASSIUM BLD-SCNC: 4.5 MMOL/L (ref 3.5–5.1)
SERVICE CMNT-IMP: ABNORMAL

## 2024-03-05 PROCEDURE — 3700000001 HC ADD 15 MINUTES (ANESTHESIA): Performed by: SURGERY

## 2024-03-05 PROCEDURE — 2500000003 HC RX 250 WO HCPCS: Performed by: REGISTERED NURSE

## 2024-03-05 PROCEDURE — 2580000003 HC RX 258: Performed by: SURGERY

## 2024-03-05 PROCEDURE — 7100000010 HC PHASE II RECOVERY - FIRST 15 MIN: Performed by: SURGERY

## 2024-03-05 PROCEDURE — 6360000002 HC RX W HCPCS: Performed by: REGISTERED NURSE

## 2024-03-05 PROCEDURE — 2709999900 HC NON-CHARGEABLE SUPPLY: Performed by: SURGERY

## 2024-03-05 PROCEDURE — 6360000002 HC RX W HCPCS: Performed by: SURGERY

## 2024-03-05 PROCEDURE — 85018 HEMOGLOBIN: CPT

## 2024-03-05 PROCEDURE — 2580000003 HC RX 258: Performed by: ANESTHESIOLOGY

## 2024-03-05 PROCEDURE — 6370000000 HC RX 637 (ALT 250 FOR IP): Performed by: ANESTHESIOLOGY

## 2024-03-05 PROCEDURE — 84132 ASSAY OF SERUM POTASSIUM: CPT

## 2024-03-05 PROCEDURE — A4217 STERILE WATER/SALINE, 500 ML: HCPCS | Performed by: SURGERY

## 2024-03-05 PROCEDURE — 3600000003 HC SURGERY LEVEL 3 BASE: Performed by: SURGERY

## 2024-03-05 PROCEDURE — 7100000011 HC PHASE II RECOVERY - ADDTL 15 MIN: Performed by: SURGERY

## 2024-03-05 PROCEDURE — 6360000002 HC RX W HCPCS: Performed by: ANESTHESIOLOGY

## 2024-03-05 PROCEDURE — 6370000000 HC RX 637 (ALT 250 FOR IP)

## 2024-03-05 PROCEDURE — C1781 MESH (IMPLANTABLE): HCPCS | Performed by: SURGERY

## 2024-03-05 PROCEDURE — 3600000013 HC SURGERY LEVEL 3 ADDTL 15MIN: Performed by: SURGERY

## 2024-03-05 PROCEDURE — 3700000000 HC ANESTHESIA ATTENDED CARE: Performed by: SURGERY

## 2024-03-05 PROCEDURE — 7100000001 HC PACU RECOVERY - ADDTL 15 MIN: Performed by: SURGERY

## 2024-03-05 PROCEDURE — 82962 GLUCOSE BLOOD TEST: CPT

## 2024-03-05 PROCEDURE — 7100000000 HC PACU RECOVERY - FIRST 15 MIN: Performed by: SURGERY

## 2024-03-05 PROCEDURE — 82565 ASSAY OF CREATININE: CPT

## 2024-03-05 DEVICE — BARD VENTRALEX HERNIA PATCH, 6.4 CM (2.5"), MEDIUM CIRCLE WITH STRAP
Type: IMPLANTABLE DEVICE | Site: ABDOMEN | Status: FUNCTIONAL
Brand: VENTRALEX

## 2024-03-05 RX ORDER — MIDAZOLAM HYDROCHLORIDE 2 MG/2ML
2 INJECTION, SOLUTION INTRAMUSCULAR; INTRAVENOUS
Status: DISCONTINUED | OUTPATIENT
Start: 2024-03-05 | End: 2024-03-05 | Stop reason: HOSPADM

## 2024-03-05 RX ORDER — OXYCODONE HYDROCHLORIDE 5 MG/1
5 TABLET ORAL PRN
Status: COMPLETED | OUTPATIENT
Start: 2024-03-05 | End: 2024-03-05

## 2024-03-05 RX ORDER — ONDANSETRON 2 MG/ML
INJECTION INTRAMUSCULAR; INTRAVENOUS PRN
Status: DISCONTINUED | OUTPATIENT
Start: 2024-03-05 | End: 2024-03-05 | Stop reason: SDUPTHER

## 2024-03-05 RX ORDER — SODIUM CHLORIDE 9 MG/ML
INJECTION, SOLUTION INTRAVENOUS PRN
Status: DISCONTINUED | OUTPATIENT
Start: 2024-03-05 | End: 2024-03-05 | Stop reason: HOSPADM

## 2024-03-05 RX ORDER — ETOMIDATE 2 MG/ML
INJECTION INTRAVENOUS PRN
Status: DISCONTINUED | OUTPATIENT
Start: 2024-03-05 | End: 2024-03-05 | Stop reason: SDUPTHER

## 2024-03-05 RX ORDER — SODIUM CHLORIDE 0.9 % (FLUSH) 0.9 %
5-40 SYRINGE (ML) INJECTION PRN
Status: DISCONTINUED | OUTPATIENT
Start: 2024-03-05 | End: 2024-03-05 | Stop reason: HOSPADM

## 2024-03-05 RX ORDER — IPRATROPIUM BROMIDE AND ALBUTEROL SULFATE 2.5; .5 MG/3ML; MG/3ML
SOLUTION RESPIRATORY (INHALATION)
Status: COMPLETED
Start: 2024-03-05 | End: 2024-03-05

## 2024-03-05 RX ORDER — PROCHLORPERAZINE EDISYLATE 5 MG/ML
5 INJECTION INTRAMUSCULAR; INTRAVENOUS
Status: DISCONTINUED | OUTPATIENT
Start: 2024-03-05 | End: 2024-03-05 | Stop reason: HOSPADM

## 2024-03-05 RX ORDER — SODIUM CHLORIDE, SODIUM LACTATE, POTASSIUM CHLORIDE, CALCIUM CHLORIDE 600; 310; 30; 20 MG/100ML; MG/100ML; MG/100ML; MG/100ML
INJECTION, SOLUTION INTRAVENOUS CONTINUOUS
Status: DISCONTINUED | OUTPATIENT
Start: 2024-03-05 | End: 2024-03-05 | Stop reason: HOSPADM

## 2024-03-05 RX ORDER — LIDOCAINE HYDROCHLORIDE 10 MG/ML
1 INJECTION, SOLUTION INFILTRATION; PERINEURAL
Status: DISCONTINUED | OUTPATIENT
Start: 2024-03-05 | End: 2024-03-05 | Stop reason: HOSPADM

## 2024-03-05 RX ORDER — DIPHENHYDRAMINE HYDROCHLORIDE 50 MG/ML
12.5 INJECTION INTRAMUSCULAR; INTRAVENOUS
Status: DISCONTINUED | OUTPATIENT
Start: 2024-03-05 | End: 2024-03-05 | Stop reason: HOSPADM

## 2024-03-05 RX ORDER — ROCURONIUM BROMIDE 10 MG/ML
INJECTION, SOLUTION INTRAVENOUS PRN
Status: DISCONTINUED | OUTPATIENT
Start: 2024-03-05 | End: 2024-03-05 | Stop reason: SDUPTHER

## 2024-03-05 RX ORDER — ACETAMINOPHEN 500 MG
1000 TABLET ORAL ONCE
Status: DISCONTINUED | OUTPATIENT
Start: 2024-03-05 | End: 2024-03-05 | Stop reason: HOSPADM

## 2024-03-05 RX ORDER — OXYCODONE HYDROCHLORIDE 5 MG/1
10 TABLET ORAL PRN
Status: COMPLETED | OUTPATIENT
Start: 2024-03-05 | End: 2024-03-05

## 2024-03-05 RX ORDER — HYDROCODONE BITARTRATE AND ACETAMINOPHEN 5; 325 MG/1; MG/1
1-2 TABLET ORAL EVERY 8 HOURS PRN
Qty: 28 TABLET | Refills: 0 | Status: SHIPPED | OUTPATIENT
Start: 2024-03-05 | End: 2024-03-12

## 2024-03-05 RX ORDER — ONDANSETRON 2 MG/ML
4 INJECTION INTRAMUSCULAR; INTRAVENOUS
Status: DISCONTINUED | OUTPATIENT
Start: 2024-03-05 | End: 2024-03-05 | Stop reason: HOSPADM

## 2024-03-05 RX ORDER — HYDROMORPHONE HYDROCHLORIDE 2 MG/ML
0.5 INJECTION, SOLUTION INTRAMUSCULAR; INTRAVENOUS; SUBCUTANEOUS EVERY 5 MIN PRN
Status: DISCONTINUED | OUTPATIENT
Start: 2024-03-05 | End: 2024-03-05 | Stop reason: HOSPADM

## 2024-03-05 RX ORDER — SODIUM CHLORIDE 0.9 % (FLUSH) 0.9 %
5-40 SYRINGE (ML) INJECTION EVERY 12 HOURS SCHEDULED
Status: DISCONTINUED | OUTPATIENT
Start: 2024-03-05 | End: 2024-03-05 | Stop reason: HOSPADM

## 2024-03-05 RX ORDER — IPRATROPIUM BROMIDE AND ALBUTEROL SULFATE 2.5; .5 MG/3ML; MG/3ML
1 SOLUTION RESPIRATORY (INHALATION) ONCE
Status: COMPLETED | OUTPATIENT
Start: 2024-03-05 | End: 2024-03-05

## 2024-03-05 RX ADMIN — ROCURONIUM BROMIDE 5 MG: 10 INJECTION, SOLUTION INTRAVENOUS at 07:29

## 2024-03-05 RX ADMIN — FENTANYL CITRATE 25 MCG: 50 INJECTION INTRAMUSCULAR; INTRAVENOUS at 07:42

## 2024-03-05 RX ADMIN — ETOMIDATE 24 MG: 2 INJECTION INTRAVENOUS at 07:11

## 2024-03-05 RX ADMIN — SODIUM CHLORIDE, POTASSIUM CHLORIDE, SODIUM LACTATE AND CALCIUM CHLORIDE: 600; 310; 30; 20 INJECTION, SOLUTION INTRAVENOUS at 05:53

## 2024-03-05 RX ADMIN — HYDROMORPHONE HYDROCHLORIDE 0.5 MG: 2 INJECTION INTRAMUSCULAR; INTRAVENOUS; SUBCUTANEOUS at 09:06

## 2024-03-05 RX ADMIN — SUGAMMADEX 50 MG: 100 INJECTION, SOLUTION INTRAVENOUS at 08:14

## 2024-03-05 RX ADMIN — FENTANYL CITRATE 25 MCG: 50 INJECTION INTRAMUSCULAR; INTRAVENOUS at 07:36

## 2024-03-05 RX ADMIN — SUGAMMADEX 50 MG: 100 INJECTION, SOLUTION INTRAVENOUS at 08:15

## 2024-03-05 RX ADMIN — ETOMIDATE 6 MG: 2 INJECTION INTRAVENOUS at 07:42

## 2024-03-05 RX ADMIN — IPRATROPIUM BROMIDE AND ALBUTEROL SULFATE 1 DOSE: 2.5; .5 SOLUTION RESPIRATORY (INHALATION) at 08:46

## 2024-03-05 RX ADMIN — SUGAMMADEX 100 MG: 100 INJECTION, SOLUTION INTRAVENOUS at 08:16

## 2024-03-05 RX ADMIN — FENTANYL CITRATE 50 MCG: 50 INJECTION INTRAMUSCULAR; INTRAVENOUS at 07:11

## 2024-03-05 RX ADMIN — IPRATROPIUM BROMIDE AND ALBUTEROL SULFATE 1 DOSE: .5; 3 SOLUTION RESPIRATORY (INHALATION) at 08:46

## 2024-03-05 RX ADMIN — ROCURONIUM BROMIDE 35 MG: 10 INJECTION, SOLUTION INTRAVENOUS at 07:12

## 2024-03-05 RX ADMIN — ROCURONIUM BROMIDE 10 MG: 10 INJECTION, SOLUTION INTRAVENOUS at 07:36

## 2024-03-05 RX ADMIN — ONDANSETRON 4 MG: 2 INJECTION INTRAMUSCULAR; INTRAVENOUS at 08:01

## 2024-03-05 RX ADMIN — HYDROMORPHONE HYDROCHLORIDE 0.5 MG: 2 INJECTION INTRAMUSCULAR; INTRAVENOUS; SUBCUTANEOUS at 09:01

## 2024-03-05 RX ADMIN — Medication 2 G: at 07:21

## 2024-03-05 RX ADMIN — OXYCODONE 10 MG: 5 TABLET ORAL at 09:10

## 2024-03-05 RX ADMIN — ROCURONIUM BROMIDE 5 MG: 10 INJECTION, SOLUTION INTRAVENOUS at 07:53

## 2024-03-05 RX ADMIN — HYDROMORPHONE HYDROCHLORIDE 0.5 MG: 2 INJECTION INTRAMUSCULAR; INTRAVENOUS; SUBCUTANEOUS at 08:55

## 2024-03-05 ASSESSMENT — PAIN DESCRIPTION - LOCATION: LOCATION: INCISION

## 2024-03-05 ASSESSMENT — PAIN - FUNCTIONAL ASSESSMENT: PAIN_FUNCTIONAL_ASSESSMENT: 0-10

## 2024-03-05 ASSESSMENT — PAIN SCALES - GENERAL
PAINLEVEL_OUTOF10: 9
PAINLEVEL_OUTOF10: 8

## 2024-03-05 ASSESSMENT — LIFESTYLE VARIABLES: SMOKING_STATUS: 0

## 2024-03-05 ASSESSMENT — COPD QUESTIONNAIRES: CAT_SEVERITY: MODERATE

## 2024-03-05 NOTE — ANESTHESIA POSTPROCEDURE EVALUATION
Department of Anesthesiology  Postprocedure Note    Patient: Darcy Plascencia  MRN: [id number]  YOB: 1950  Date of evaluation: 3/5/2024    Procedure Summary       Date: 03/05/24 Room / Location: [facility] MAIN OR  / [facility] MAIN OR    Anesthesia Start: 0657 Anesthesia Stop: 0838    Procedure: OPEN REPAIR INCARCERATED UMBILICAL HERNIA WITH MESH (Abdomen) Diagnosis:       Umbilical hernia without obstruction or gangrene      (Umbilical hernia without obstruction or gangrene [K42.9])    Providers: John Valle MD Responsible Provider: Valentin Munson MD    Anesthesia Type: general ASA Status: 3            Anesthesia Type: No value filed.    Colten Phase I: Colten Score: 3    Colten Phase II: Colten Score: 9    Anesthesia Post Evaluation    Patient location during evaluation: PACU  Patient participation: complete - patient participated  Level of consciousness: awake and alert  Airway patency: patent  Nausea & Vomiting: no nausea and no vomiting  Cardiovascular status: hemodynamically stable  Respiratory status: acceptable, nonlabored ventilation and spontaneous ventilation  Hydration status: euvolemic  Comments: BP (!) 147/64   Pulse 52   Temp 97.7 °F (36.5 °C) (Temporal)   Resp 16   Ht 1.575 m (5' 2\")   Wt 101.2 kg (223 lb)   SpO2 92%   BMI 40.79 kg/m²     Multimodal analgesia pain management approach  Pain management: adequate and satisfactory to patient        No notable events documented.

## 2024-03-05 NOTE — ANESTHESIA PRE PROCEDURE
Mildly increased wall thickness. Normal wall motion. Abnormal diastolic function.    Mitral Valve: Mild regurgitation.    Tricuspid Valve: Moderate regurgitation. Moderately to severely elevated RVSP. The estimated RVSP is 60 mmHg.    Left Atrium: Left atrium is mildly dilated. LA Vol Index is  37 ml/m2.       Neuro/Psych:   (+) psychiatric history:            GI/Hepatic/Renal:   (+) GERD:, renal disease:     (-) liver disease       Endo/Other:    (+) DiabetesType II DM.                 Abdominal:             Vascular: negative vascular ROS.         Other Findings:             Anesthesia Plan      general     ASA 3     (Will use fentanyl/versed due to undocumented allergy to propofol. It was blamed for weakness and paralysis after severe asthma in 2013 requiring ventilation for 2 weeks. Discussed with patient and daughter and they wish to avoid propofol. Increased awareness agreed to.)  Induction: intravenous.    MIPS: Postoperative opioids intended and Prophylactic antiemetics administered.  Anesthetic plan and risks discussed with patient.    Use of blood products discussed with patient whom consented to blood products.                      MARICARMEN ALVARADO MD   3/5/2024

## 2024-03-05 NOTE — H&P
factors   ?Diagnosis or treatment significantly limited by social determinants of health       48045  50321 High High  ?1or more chronic illnesses with severe exacerbation, progression, or side effects of treatment;    or  ?1 acute or chronic illness or injury that poses a threat to life or bodily function--umbilical hernia with morbid obesity progression to BMI greater than 40 and incarceration of the   Extensive  (Must meet the requirements of at least 2 out of 3 categories)  Category 1: Tests, documents, or independent historian(s)  ?Any combination of 3 from the following:   ?Review of prior external note(s) from each unique source*;  ?Review of the result(s) of each unique test*;   ?Ordering of each unique test*;   ?Assessment requiring an independent historian(s)    or   Category 2: Independent interpretation of tests   ?Independent interpretation of a test performed by another physician/other qualified health care professional (not separately reported);     or  Category 3: Discussion of management or test interpretation  ?Discussion of management or test interpretation with external physician/other qualified health care professional/appropriate source (not separately reported)   High risk of morbidity from additional diagnostic testing or treatment  Examples only:  ?Drug therapy requiring intensive monitoring for toxicity  ?Decision regarding elective major surgery with identified patient or procedure risk factors  ?Decision regarding emergency major surgery  ?Decision regarding hospitalization  ?Decision not to resuscitate or to de-escalate care because of poor prognosis      Parenteral controlled substances             I have personally performed a face-to-face diagnostic evaluation and management  service on this patient.      I have independently seen the patient.   I have independently obtained the above history from the patient/family.    I have independently examined the patient with above findings.  I

## 2024-03-05 NOTE — DISCHARGE INSTRUCTIONS
Dressings/Wound Care  Leave your incisional dressings alone until follow-up visit.  Try to keep incisions as dry as possible to lower risk of infection.      Activity  No heavy lifting (>5lbs) for 6 weeks to reduce risk of developing a recurrent hernia.  No driving until you are off pain meds for 24hrs and have no pain with movements associated with driving.  Lose weight to lower risks of recurrent hernia    Meds  Pain prescription (Norco) electronically sent to your pharmacy    Follow-up  Follow-up with Dr Valle's assistants Mable Lama NP in 13 days in the office on a Monday.  See Dr Valle as needed after that visit.  [facility] Office  [address], [address]  [phone];  press option 1 for the [facility] office    Diet  Soups, Juices, Liquids, Yogurts, and Puddings for 1-2 days  Then soft, low-fat diet until follow-up        After general anesthesia or intravenous sedation, for 24 hours or while taking prescription Narcotics:  Limit your activities  Some people will feel drowsy or dizzy for up to a few hours after waking up.  A responsible adult needs to be with you for the next 24 hours  Do not drive and operate hazardous machinery  Do not make important personal or business decisions  Do not drink alcoholic beverages  If you have not urinated within 8 hours after discharge, and you are experiencing discomfort from urinary retention, please go to the nearest ED.  If you have sleep apnea and have a CPAP machine, please use it for all naps and sleeping.  Please use caution when taking narcotics and any of your home medications that may cause drowsiness.  *  Please give a list of your current medications to your Primary Care Provider.  *  Please update this list whenever your medications are discontinued, doses are      changed, or new medications (including over-the-counter products) are added.  *  Please carry medication information at all times in case of emergency

## 2024-03-05 NOTE — OP NOTE
the hernia.  The defect in the fascia was 4 cm.  Vancomycin irrigation was used.  We cleared the fascial edges.  We placed a 6.4 cm circular Ventralex mesh in the proper orientation in the subfascial position and secured it peripherally with interrupted 0 Prolene sutures through the best bites of fascia available.  Vancomycin irrigation was used.  This mesh had been presoaked in vancomycin irrigation as well.  Hemostasis was confirmed.  Interrupted deep dermal 3-0 Vicryl sutures were placed.  The skin was closed with clips.  Sterile dressing was placed consisting of 4 x 4's and the remaining portion of Ioban.  The skin ischemia persisted, but was markedly improved at the end of the case.  The patient tolerated the procedure well.  There were no immediate complications.        MD PAZ CARLOS/AQS  D:  03/05/2024 08:35:42  T:  03/05/2024 11:39:09  JOB #:  [id number]

## 2024-03-18 ENCOUNTER — OFFICE VISIT (OUTPATIENT)
Dept: SURGERY | Age: 74
End: 2024-03-18

## 2024-03-18 VITALS
HEIGHT: 62 IN | OXYGEN SATURATION: 96 % | SYSTOLIC BLOOD PRESSURE: 162 MMHG | BODY MASS INDEX: 40.79 KG/M2 | HEART RATE: 60 BPM | DIASTOLIC BLOOD PRESSURE: 76 MMHG

## 2024-03-18 DIAGNOSIS — K42.9 UMBILICAL HERNIA WITHOUT OBSTRUCTION OR GANGRENE: ICD-10-CM

## 2024-03-18 DIAGNOSIS — Z09 POSTOPERATIVE EXAMINATION: Primary | ICD-10-CM

## 2024-03-18 DIAGNOSIS — K42.0 INCARCERATED UMBILICAL HERNIA: ICD-10-CM

## 2024-03-18 PROCEDURE — 99024 POSTOP FOLLOW-UP VISIT: CPT

## 2024-03-18 RX ORDER — DOXYCYCLINE HYCLATE 100 MG
100 TABLET ORAL 2 TIMES DAILY
Qty: 14 TABLET | Refills: 0 | Status: SHIPPED | OUTPATIENT
Start: 2024-03-18 | End: 2024-03-25

## 2024-03-18 NOTE — PROGRESS NOTES
emergency major surgery  ?Decision regarding hospitalization  ?Decision not to resuscitate or to de-escalate care because of poor prognosis      Parenteral controlled substances             I have personally performed a face-to-face diagnostic evaluation and management  service on this patient.      I have independently seen the patient.   I have independently obtained the above history from the patient/family.    I have independently examined the patient with above findings.  I have independently reviewed data/labs for this patient and developed the above plan of care (MDM).      Signed: RHODA Flynn NP  3/18/2024    2:21 PM

## 2024-03-18 NOTE — PATIENT INSTRUCTIONS
Open repair of incarcerated umbilical hernia, defect 4 cm, with 6.4 cm circular Ventralex mesh. Dr. Valle 3/5/24  Follow up with Mable Will NP in 1 week  7 day course of doxycycline  Lift nothing heavier than 15 lbs for 6 weeks after surgery  Do not get constipated. No more than 1 day without a bm. If 1 day no bm, then take colace stool softener twice a day. If 24 hours of taking colace stool softener does not produce a bm , then keep taking colace and add miralax laxative twice a day until you have a bm. Once you are having regular bms, you can use colace and/or miralax as needed to ensure you have a regular daily bm.   5.   Call the office with signs of infection, uncontrolled pain, nausea, vomiting, or any problems.   6. Change umbilical dressing when saturated. Cover with guaze and tape.  7. Showers allowed. Gently wash incision with antibacterial soap and pat dry. Replace dressing after showering.

## 2024-03-22 NOTE — H&P
[facility] [address] HISTORY AND PHYSICAL Lino Calixto 
MR#: [id number] : 1950 ACCOUNT #: [de-identified] ADMIT DATE: 2018 HISTORY OF PRESENT ILLNESS:  The patient is a pleasant 76-year-old female who presents today for a lumbar epidural steroid injection. She complains of pain in her lower back and right leg consistent with a radiculopathy. The pain started years ago, but has gotten worse of late. She had an MRI of her lumbar spine on 10/08/2018 which shows multilevel disk bulging and varying degrees of spinal and neural foraminal stenosis and spondylosis. She presents to [facility] for her first lumbar epidural steroid injection. PAST MEDICAL HISTORY: 
1. Coronary artery disease. 2.  Pulmonary fibrosis. 3.  Diabetes. 4.  Hypertension. 5.  Asthma. 6.  Crohn's disease. 7.  Sleep apnea. 8.  Spinal stenosis. PAST SURGICAL HISTORY:  Sinus surgery. CURRENT MEDICATIONS: 
1. Tylenol. 2.  Albuterol metered dose inhaler. 3.  Amlodipine. 4.  Aspirin 81 mg per day. 5.  Atorvastatin. 6.  Chlorthalidone. 7.  Klonopin. 8.  Clonidine. 9.  Plavix. 10.  Vitamin B12. 
11.  Cymbalta 60 mg per day. 12.  Nexium. 13.  Iron supplements. 14.  Metformin. 15.  Nitroglycerin p.r.n. 16.  Nystatin p.r.n. ALLERGIES: 
1. \"DANA\" CAUSE ANAPHYLAXIS. 2.  LATEX CAUSES A RASH. 3.  CEPHALOSPORINS CAUSE A RASH. 4.  MORPHINE, ROCEPHIN, PENICILLIN CAUSE RASH. SOCIAL HISTORY:  Denies smoking or alcohol abuse. FAMILY HISTORY:  Noncontributory other than heart disease, diabetes. REVIEW OF SYSTEMS:  Noncontributory. PHYSICAL EXAMINATION: 
GENERAL:  A pleasant middle-aged female sitting in a chair in no acute distress. VITAL SIGNS:  Afebrile. Vital signs stable. CHEST:  Clear. HEART:  Regular rate and rhythm. ABDOMEN:  Benign. EXTREMITIES:  No clubbing, cyanosis or edema. MUSCULOSKELETAL:  Reveals tenderness over the lower lumbar spinous processes and paraspinous muscles. NEUROLOGIC:  Alert and oriented x3. Cranial nerves II-XII grossly intact. Sensory:  Normal light touch throughout both legs. Motor:  Normal strength throughout both legs. Reflexes 2+ and symmetric in both patellae; diminished in both Achilles. SUMMARY:  This is a pleasant 69-year-old female with chronic pain in her back and mainly right leg, who presents for her lumbar epidural steroid injection at [place]. ASSESSMENT:  Lumbar spondylosis and spinal stenosis with low back pain and right lower extremity radiculopathy. PLAN:  Patient will be taken to the operating room tomorrow for her first lumbar epidural steroid injection at [place]. MD MANISH Clement/[place] 
D: 12/03/2018 18:37    
T: 12/03/2018 20:05 JOB #: [id number] None known

## 2024-03-27 ENCOUNTER — OFFICE VISIT (OUTPATIENT)
Dept: SURGERY | Age: 74
End: 2024-03-27

## 2024-03-27 VITALS — BODY MASS INDEX: 41.04 KG/M2 | WEIGHT: 223 LBS | HEIGHT: 62 IN

## 2024-03-27 DIAGNOSIS — K42.9 UMBILICAL HERNIA WITHOUT OBSTRUCTION OR GANGRENE: ICD-10-CM

## 2024-03-27 DIAGNOSIS — K42.0 INCARCERATED UMBILICAL HERNIA: Primary | ICD-10-CM

## 2024-03-27 NOTE — PROGRESS NOTES
regarding elective major surgery without identified patient or procedure risk factors   ?Diagnosis or treatment significantly limited by social determinants of health       76312  31997 High High  ?1or more chronic illnesses with severe exacerbation, progression, or side effects of treatment;    or  ?1 acute or chronic illness or injury that poses a threat to life or bodily function--umbilical hernia with morbid obesity progression to BMI greater than 40 and incarceration of the   Extensive  (Must meet the requirements of at least 2 out of 3 categories)  Category 1: Tests, documents, or independent historian(s)  ?Any combination of 3 from the following:   ?Review of prior external note(s) from each unique source*;  ?Review of the result(s) of each unique test*;   ?Ordering of each unique test*;   ?Assessment requiring an independent historian(s)    or   Category 2: Independent interpretation of tests   ?Independent interpretation of a test performed by another physician/other qualified health care professional (not separately reported);     or  Category 3: Discussion of management or test interpretation  ?Discussion of management or test interpretation with external physician/other qualified health care professional/appropriate source (not separately reported)   High risk of morbidity from additional diagnostic testing or treatment  Examples only:  ?Drug therapy requiring intensive monitoring for toxicity  ?Decision regarding elective major surgery with identified patient or procedure risk factors  ?Decision regarding emergency major surgery  ?Decision regarding hospitalization  ?Decision not to resuscitate or to de-escalate care because of poor prognosis      Parenteral controlled substances             I have personally performed a face-to-face diagnostic evaluation and management  service on this patient.      I have independently seen the patient.   I have independently obtained the above history from the

## 2024-04-01 NOTE — TELEPHONE ENCOUNTER
Spoke to patient and informed her there is still some scarring in both lungs, but that it doesn't seem to have progressed since 2020. Further workup can be considered at your next visit (?BAL), and when trying to understand why the scarring is there, we should consider the possible extrapulmonary manifestations of IBD as well as drug toxicities from medications for UC that may have been used in past (sulfasalazine?, mesalamine?). Patient remind to get blood work done and voices understanding.

## 2024-04-10 ENCOUNTER — OFFICE VISIT (OUTPATIENT)
Dept: SURGERY | Age: 74
End: 2024-04-10

## 2024-04-10 VITALS — HEIGHT: 62 IN | WEIGHT: 223 LBS | BODY MASS INDEX: 41.04 KG/M2

## 2024-04-10 DIAGNOSIS — K42.0 INCARCERATED UMBILICAL HERNIA: Primary | ICD-10-CM

## 2024-04-10 DIAGNOSIS — J84.9 INTERSTITIAL LUNG DISEASE (HCC): ICD-10-CM

## 2024-04-10 NOTE — PROGRESS NOTES
requiring an independent historian(s)  (For the categories of independent interpretation of tests and discussion of management or test interpretation, see moderate or high) Low risk of morbidity from additional diagnostic testing or treatment     13744  24564 Mod Moderate  ?1or more chronic illnesses with exacerbation, progression, or side effects of treatment;    or  ?2or more stable chronic illnesses;    or  ?1undiagnosed new problem with uncertain prognosis;    or  ?1acute illness with systemic symptoms;    or  ?1acute complicated injury   Moderate  (Must meet the requirements of at least 1 out of 3 categories)  Category 1: Tests, documents, or independent historian(s)  ?Any combination of 3 from the following:   ?Review of prior external note(s) from each unique source*;  ?Review of the result(s) of each unique test*;  ?Ordering of each unique test*;  ?Assessment requiring an independent historian(s)    or  Category 2: Independent interpretation of tests   ?Independent interpretation of a test performed by another physician/other qualified health care professional (not separately reported);     or  Category 3: Discussion of management or test interpretation  ?Discussion of management or test interpretation with external physician/other qualified health care professional/appropriate source (not separately reported)   Moderate risk of morbidity from additional diagnostic testing or treatment  Examples only:  ?Prescription drug management   ?Decision regarding minor surgery with identified patient or procedure risk factors  ?Decision regarding elective major surgery without identified patient or procedure risk factors   ?Diagnosis or treatment significantly limited by social determinants of health       10785  40121 High High  ?1or more chronic illnesses with severe exacerbation, progression, or side effects of treatment;    or  ?1 acute or chronic illness or injury that poses a threat to life or bodily

## 2024-04-12 LAB
ENA SS-A AB SER-ACNC: <0.2 AI (ref 0–0.9)
ENA SS-B AB SER-ACNC: <0.2 AI (ref 0–0.9)

## 2024-05-01 NOTE — PROGRESS NOTES
during the day.  She describes severe shortness of breath which she describes as functional class II.      Today we reviewed multiple studies which have been performed to evaluate the source of her dyspnea and PH.  Most recently she had a right heart catheterization that demonstrated isolated precapillary pulmonary hypertension with a mean PA pressure of 39 mmHg, pulmonary capillary wedge pressure of 10, and PVR of 6.4WU.  She has also had imaging demonstrating bilateral scarring in a nonspecific pattern, last CT in 2020.  Her R hemidiaphragm appears paralyzed on CXR.      She has dyspnea with almost any exertional activity now.  Denies syncope or significant edema.  She is on no major immunosuppressants for UC.  She denies any synovitis but confirms chronic dry eyes, dry mouth. She thinks she has completed pulmonary rehab in the past.      Tobacco Use      Smoking status: Never      Smokeless tobacco: Never    Second Hand Smoke Exposure: Yes  Birds: Yes  Asbestos: No  TB: No    Occupation/Hobbies:   worked as a  for school district.  Sometimes noticed dyspnea with cleaning chemicals    FH:  Sister with MS,    Debra had COPD  Mother had COPD  Strong family history of COPD    Medication review  Diuretics  None    Vasodilators    Prior trials and side effects      Risk Assessment  WHO Functional Class  []  I.  No limitation of physical activity from PH. Ordinary physical activity does not cause undue dyspnea or fatigue, chest pain, or near syncope.  [x]  II.  Slight limitation of physical activity from PH. Patient is comfortable at rest, but ordinary physical activity causes undue dyspnea or fatigue, chest pain, or near syncope.  []  III.  Marked limitation of physical activity from PH. Patient is comfortable at rest, but less than ordinary physical activity causes undue dyspnea or fatigue, chest pain, or near syncope.  []  IV.  Inability to carry out any physical activity without symptoms. Patient has signs

## 2024-05-02 ENCOUNTER — OFFICE VISIT (OUTPATIENT)
Dept: PULMONOLOGY | Age: 74
End: 2024-05-02
Payer: MEDICARE

## 2024-05-02 VITALS
OXYGEN SATURATION: 92 % | RESPIRATION RATE: 16 BRPM | HEART RATE: 60 BPM | HEIGHT: 62 IN | SYSTOLIC BLOOD PRESSURE: 310 MMHG | BODY MASS INDEX: 40.67 KG/M2 | DIASTOLIC BLOOD PRESSURE: 66 MMHG | WEIGHT: 221 LBS | TEMPERATURE: 98.1 F

## 2024-05-02 DIAGNOSIS — I27.20 PULMONARY HYPERTENSION (HCC): Primary | ICD-10-CM

## 2024-05-02 DIAGNOSIS — R09.02 EXERCISE HYPOXEMIA: ICD-10-CM

## 2024-05-02 DIAGNOSIS — J98.4 PULMONARY SCARRING: ICD-10-CM

## 2024-05-02 PROCEDURE — G8399 PT W/DXA RESULTS DOCUMENT: HCPCS | Performed by: INTERNAL MEDICINE

## 2024-05-02 PROCEDURE — 3078F DIAST BP <80 MM HG: CPT | Performed by: INTERNAL MEDICINE

## 2024-05-02 PROCEDURE — 3017F COLORECTAL CA SCREEN DOC REV: CPT | Performed by: INTERNAL MEDICINE

## 2024-05-02 PROCEDURE — 1123F ACP DISCUSS/DSCN MKR DOCD: CPT | Performed by: INTERNAL MEDICINE

## 2024-05-02 PROCEDURE — G8417 CALC BMI ABV UP PARAM F/U: HCPCS | Performed by: INTERNAL MEDICINE

## 2024-05-02 PROCEDURE — G8427 DOCREV CUR MEDS BY ELIG CLIN: HCPCS | Performed by: INTERNAL MEDICINE

## 2024-05-02 PROCEDURE — 1036F TOBACCO NON-USER: CPT | Performed by: INTERNAL MEDICINE

## 2024-05-02 PROCEDURE — 99215 OFFICE O/P EST HI 40 MIN: CPT | Performed by: INTERNAL MEDICINE

## 2024-05-02 PROCEDURE — 1090F PRES/ABSN URINE INCON ASSESS: CPT | Performed by: INTERNAL MEDICINE

## 2024-05-02 PROCEDURE — 3077F SYST BP >= 140 MM HG: CPT | Performed by: INTERNAL MEDICINE

## 2024-05-05 RX ORDER — TADALAFIL 20 MG/1
40 TABLET ORAL DAILY
Qty: 60 TABLET | Refills: 11 | Status: SHIPPED | OUTPATIENT
Start: 2024-05-05

## 2024-05-15 ENCOUNTER — TELEPHONE (OUTPATIENT)
Dept: PULMONOLOGY | Age: 74
End: 2024-05-15

## 2024-05-15 DIAGNOSIS — R09.02 HYPOXIA: ICD-10-CM

## 2024-05-15 DIAGNOSIS — I27.20 PULMONARY HYPERTENSION (HCC): ICD-10-CM

## 2024-05-15 DIAGNOSIS — J45.40 MODERATE PERSISTENT ASTHMA WITHOUT COMPLICATION: ICD-10-CM

## 2024-05-15 NOTE — TELEPHONE ENCOUNTER
Patients granddaughter Flaco Rai says that her and the patient are confused as to why she has been put on the         Tadalafil, PAH, 20 MG tablet     And why the patient has been changed over to Dr. Sousa from [facility] and Dr. Sidhu ask for a call

## 2024-05-15 NOTE — TELEPHONE ENCOUNTER
Spoke to Sharath the granddaughter and explained the medication and what it is for and why she is seeing Dr. Sousa and she voices understanding.

## 2024-05-30 ENCOUNTER — TELEPHONE (OUTPATIENT)
Dept: PULMONOLOGY | Age: 74
End: 2024-05-30

## 2024-05-30 ENCOUNTER — TELEPHONE (OUTPATIENT)
Age: 74
End: 2024-05-30

## 2024-05-30 DIAGNOSIS — R76.8 RHEUMATOID FACTOR POSITIVE: Primary | ICD-10-CM

## 2024-05-30 RX ORDER — AMLODIPINE BESYLATE 2.5 MG/1
2.5 TABLET ORAL 2 TIMES DAILY
Qty: 60 TABLET | Refills: 5 | Status: SHIPPED | OUTPATIENT
Start: 2024-05-30

## 2024-05-30 NOTE — TELEPHONE ENCOUNTER
Granddaughter called in and reported pt.was seen in ER last night for high /68.BP has been fine until yesterday w/SBP normally in 130's.Today BP is 157/77 hr=58.Wants to know if any med changes needed?    She is on Jardiance 25mg qday,Labeatolol 200mg bid,Lotensin 40mg qday,Norvasc 2.5mg qday,Chlorthalidone 25mg qday,Clonidine 0.1mg bid and Tadalfil 40mg qday.

## 2024-05-30 NOTE — TELEPHONE ENCOUNTER
Last seen: 5/2/24  Hx: PAH, exercise hypoxemia, pulmonary scarring, CAD, CKD, DM, asthma, NEFTALI on CPAP, HTN    Started on tadalafil. Patient reporting had to seek ER tx for elevated b/p, asking if patient can quit new med.  Confirmed daughter & granddaughter on ALVIN.    Contacted granddaughter, reports medication (tadalafil) caused leg edema, h/a and elevated b/p; has taken for the last 6 days.

## 2024-05-30 NOTE — TELEPHONE ENCOUNTER
Mejia Laird MD  You59 minutes ago (12:13 PM)       Increase amlodipine to 2.5 mg twice daily   I called and informed pt.of MD response and she v/u.Med escribed as below:  Requested Prescriptions     Signed Prescriptions Disp Refills    amLODIPine (NORVASC) 2.5 MG tablet 60 tablet 5     Sig: Take 1 tablet by mouth in the morning and at bedtime     Authorizing Provider: MEJIA LAIRD     Ordering User: GERONIMO COE

## 2024-05-30 NOTE — TELEPHONE ENCOUNTER
Patient had to go to [facility] ER ast night as her BP was 197/68. Granddaughter thinks it may be the recent medication Dr. Sousa put her on: Tadalafil, PAH, 20 MG tablet  . Please call the granddaughter Sharath at [phone] as she wants to know if she can quit taking it.

## 2024-06-01 NOTE — TELEPHONE ENCOUNTER
\"My head hurt really bad and my legs were very painful and I had to go the emergency room.\"  This was after taking tadalafil 20mg.      She stopped taking tadalafil 20mg for the last 2-3 days.  Her head still hurts some.    The legs are still hurting.    I recommend that we she stay off the tadalafil permanently.    Evelin, please help me arrange riociguat instead.  We should start it in 2 weeks if possible.    Please have the patient get a CCP antibody drawn as well, because her rheumatoid factor is positive.    Thank you

## 2024-08-20 ENCOUNTER — OFFICE VISIT (OUTPATIENT)
Age: 74
End: 2024-08-20
Payer: MEDICARE

## 2024-08-20 VITALS
WEIGHT: 222 LBS | HEIGHT: 62 IN | HEART RATE: 68 BPM | DIASTOLIC BLOOD PRESSURE: 76 MMHG | BODY MASS INDEX: 40.85 KG/M2 | SYSTOLIC BLOOD PRESSURE: 120 MMHG

## 2024-08-20 DIAGNOSIS — I10 PRIMARY HYPERTENSION: Primary | ICD-10-CM

## 2024-08-20 PROCEDURE — G8417 CALC BMI ABV UP PARAM F/U: HCPCS | Performed by: INTERNAL MEDICINE

## 2024-08-20 PROCEDURE — 1090F PRES/ABSN URINE INCON ASSESS: CPT | Performed by: INTERNAL MEDICINE

## 2024-08-20 PROCEDURE — 99213 OFFICE O/P EST LOW 20 MIN: CPT | Performed by: INTERNAL MEDICINE

## 2024-08-20 PROCEDURE — 3017F COLORECTAL CA SCREEN DOC REV: CPT | Performed by: INTERNAL MEDICINE

## 2024-08-20 PROCEDURE — G8427 DOCREV CUR MEDS BY ELIG CLIN: HCPCS | Performed by: INTERNAL MEDICINE

## 2024-08-20 PROCEDURE — 1123F ACP DISCUSS/DSCN MKR DOCD: CPT | Performed by: INTERNAL MEDICINE

## 2024-08-20 PROCEDURE — 3074F SYST BP LT 130 MM HG: CPT | Performed by: INTERNAL MEDICINE

## 2024-08-20 PROCEDURE — 1036F TOBACCO NON-USER: CPT | Performed by: INTERNAL MEDICINE

## 2024-08-20 PROCEDURE — G8399 PT W/DXA RESULTS DOCUMENT: HCPCS | Performed by: INTERNAL MEDICINE

## 2024-08-20 PROCEDURE — 3078F DIAST BP <80 MM HG: CPT | Performed by: INTERNAL MEDICINE

## 2024-08-20 RX ORDER — VALSARTAN 160 MG/1
160 TABLET ORAL 2 TIMES DAILY
Qty: 180 TABLET | Refills: 1 | Status: SHIPPED | OUTPATIENT
Start: 2024-08-20

## 2024-08-20 NOTE — PROGRESS NOTES
[facility] CARDIOLOGY  [address] [place] [address], [address]  [place] [address]  PHONE: [phone]        24        NAME:  Darcy Plascencia  : 1950  MRN: [id number]     1. Crohn's disease of small intestine without complication (HCC)  2. Essential hypertension  3. Atherosclerosis of native coronary artery of native heart without angina pectoris  4. Mild intermittent asthma without complication  5. Type 2 diabetes mellitus without complication (HCC)  6. Obstructive sleep apnea  7. ILD (interstitial lung disease) (HCC)  8. Bronchiectasis without complication (HCC)  9. Gastroesophageal reflux disease with esophagitis  10. Other chronic pain  11. Other depression  13: CKD    CHIEF COMPLAINT:    6 Month Follow-Up, Hypertension, and Coronary Artery Disease      SUBJECTIVE:     No chest pain or palpitation or dizziness.  BP ok in AM but runs high in M and + 1 visit for high BP       Medications were all reviewed with the patient today and updated as necessary.   Current Outpatient Medications   Medication Sig    valsartan (DIOVAN) 160 MG tablet Take 1 tablet by mouth 2 times daily    amLODIPine (NORVASC) 2.5 MG tablet Take 1 tablet by mouth in the morning and at bedtime    BiPAP Machine MISC by Does not apply route    OXYGEN Inhale into the lungs 3lpm 02 during day and 4lpm 02 bled into bipap qhs    cloNIDine (CATAPRES) 0.1 MG tablet TAKE ONE TABLET BY MOUTH TWICE A DAY    doxepin (SINEQUAN) 10 MG capsule Take 1 capsule by mouth nightly    glimepiride (AMARYL) 2 MG tablet Take 1 tablet by mouth daily    vitamin C (ASCORBIC ACID) 500 MG tablet Take 1 tablet by mouth daily    ASHWAGANDHA PO Take 1 tablet by mouth daily    Acetaminophen (TYLENOL 8 HOUR ARTHRITIS PAIN PO) Take 1 tablet by mouth in the morning and at bedtime    labetalol (NORMODYNE) 200 MG tablet TAKE ONE TABLET BY MOUTH TWICE A DAY    mometasone-formoterol (DULERA) 100-5 MCG/ACT inhaler Inhale 2 puffs into the lungs 2 times daily Dx code j45.9

## 2024-08-23 ENCOUNTER — TELEPHONE (OUTPATIENT)
Dept: SLEEP MEDICINE | Age: 74
End: 2024-08-23

## 2024-09-09 ENCOUNTER — TELEPHONE (OUTPATIENT)
Dept: PULMONOLOGY | Age: 74
End: 2024-09-09

## 2024-09-09 NOTE — TELEPHONE ENCOUNTER
Patient called refill line requesting doxepin (SINEQUAN) 10 MG capsule to go to Pharmacy: [id number] [place] - [address] - P [phone] - F [phone]. Corrina correa

## 2024-09-12 NOTE — TELEPHONE ENCOUNTER
Called to schedule. Pt is scheduled for 9/28/24 @ 1:30. She asked that I speak with her granddaughter, and gave her the phone. The granddaughter asked if medication had been called in. I let her know that I am not clinical and I do not think that any of our providers will send it in until she is seen., but again I am not clinical and can't make that decision. She states that pt missed appointment because she did not feel good and was not able to come in. She then states she will call primary care to see if they will write prescription. She states that she does not want to continue to see our providers if we can't write the medicine when she needs it.     I asked her to please call us with at least 24 hours notice if she is not coming to appointment and stressed that we have a very long wait list. We appreciate at least 24 hours notice.

## 2024-09-17 ENCOUNTER — PREP FOR PROCEDURE (OUTPATIENT)
Dept: PULMONOLOGY | Age: 74
End: 2024-09-17

## 2024-09-17 ENCOUNTER — NURSE ONLY (OUTPATIENT)
Dept: PULMONOLOGY | Age: 74
End: 2024-09-17
Payer: MEDICARE

## 2024-09-17 ENCOUNTER — OFFICE VISIT (OUTPATIENT)
Dept: PULMONOLOGY | Age: 74
End: 2024-09-17
Payer: MEDICARE

## 2024-09-17 VITALS
TEMPERATURE: 97.2 F | SYSTOLIC BLOOD PRESSURE: 110 MMHG | WEIGHT: 213 LBS | OXYGEN SATURATION: 92 % | DIASTOLIC BLOOD PRESSURE: 66 MMHG | BODY MASS INDEX: 39.2 KG/M2 | RESPIRATION RATE: 18 BRPM | HEIGHT: 62 IN | HEART RATE: 55 BPM

## 2024-09-17 VITALS
SYSTOLIC BLOOD PRESSURE: 110 MMHG | HEART RATE: 55 BPM | HEIGHT: 62 IN | TEMPERATURE: 97.2 F | OXYGEN SATURATION: 92 % | DIASTOLIC BLOOD PRESSURE: 66 MMHG | BODY MASS INDEX: 39.2 KG/M2 | WEIGHT: 213 LBS | RESPIRATION RATE: 18 BRPM

## 2024-09-17 DIAGNOSIS — J96.11 CHRONIC RESPIRATORY FAILURE WITH HYPOXIA (HCC): ICD-10-CM

## 2024-09-17 DIAGNOSIS — J84.9 ILD (INTERSTITIAL LUNG DISEASE) (HCC): Primary | ICD-10-CM

## 2024-09-17 DIAGNOSIS — I27.20 PULMONARY HTN (HCC): ICD-10-CM

## 2024-09-17 DIAGNOSIS — J84.9 ILD (INTERSTITIAL LUNG DISEASE) (HCC): ICD-10-CM

## 2024-09-17 DIAGNOSIS — I27.20 PULMONARY HYPERTENSION (HCC): ICD-10-CM

## 2024-09-17 DIAGNOSIS — R06.09 DYSPNEA ON EXERTION: ICD-10-CM

## 2024-09-17 DIAGNOSIS — R06.09 DYSPNEA ON EXERTION: Primary | ICD-10-CM

## 2024-09-17 LAB — NT PRO BNP: 372 PG/ML (ref 0–450)

## 2024-09-17 PROCEDURE — 3074F SYST BP LT 130 MM HG: CPT | Performed by: INTERNAL MEDICINE

## 2024-09-17 PROCEDURE — 3017F COLORECTAL CA SCREEN DOC REV: CPT | Performed by: INTERNAL MEDICINE

## 2024-09-17 PROCEDURE — 1123F ACP DISCUSS/DSCN MKR DOCD: CPT | Performed by: INTERNAL MEDICINE

## 2024-09-17 PROCEDURE — 99215 OFFICE O/P EST HI 40 MIN: CPT | Performed by: INTERNAL MEDICINE

## 2024-09-17 PROCEDURE — 1036F TOBACCO NON-USER: CPT | Performed by: INTERNAL MEDICINE

## 2024-09-17 PROCEDURE — G8427 DOCREV CUR MEDS BY ELIG CLIN: HCPCS | Performed by: INTERNAL MEDICINE

## 2024-09-17 PROCEDURE — G8399 PT W/DXA RESULTS DOCUMENT: HCPCS | Performed by: INTERNAL MEDICINE

## 2024-09-17 PROCEDURE — 94618 PULMONARY STRESS TESTING: CPT | Performed by: INTERNAL MEDICINE

## 2024-09-17 PROCEDURE — 1090F PRES/ABSN URINE INCON ASSESS: CPT | Performed by: INTERNAL MEDICINE

## 2024-09-17 PROCEDURE — G8417 CALC BMI ABV UP PARAM F/U: HCPCS | Performed by: INTERNAL MEDICINE

## 2024-09-17 PROCEDURE — 3078F DIAST BP <80 MM HG: CPT | Performed by: INTERNAL MEDICINE

## 2024-09-17 RX ORDER — SODIUM CHLORIDE 0.9 % (FLUSH) 0.9 %
5-40 SYRINGE (ML) INJECTION EVERY 12 HOURS SCHEDULED
Status: CANCELLED | OUTPATIENT
Start: 2024-09-17

## 2024-09-17 RX ORDER — SODIUM CHLORIDE 9 MG/ML
INJECTION, SOLUTION INTRAVENOUS PRN
Status: CANCELLED | OUTPATIENT
Start: 2024-09-17

## 2024-09-17 RX ORDER — SODIUM CHLORIDE 0.9 % (FLUSH) 0.9 %
5-40 SYRINGE (ML) INJECTION PRN
Status: CANCELLED | OUTPATIENT
Start: 2024-09-17

## 2024-09-17 RX ORDER — FERROUS SULFATE 325(65) MG
TABLET ORAL
COMMUNITY
Start: 2024-08-10

## 2024-09-18 LAB — CCP IGA+IGG SERPL IA-ACNC: 5 UNITS (ref 0–19)

## 2024-09-21 DIAGNOSIS — I20.9 ANGINA PECTORIS, UNSPECIFIED (HCC): ICD-10-CM

## 2024-09-21 LAB
A FUMIGATUS1 AB SER QL ID: NEGATIVE
A PULLULANS AB SER QL: NEGATIVE
LACEYELLA SACCHARI AB SER QL: NEGATIVE
PIGEON SERUM AB QL ID: NEGATIVE
S RECTIVIRGULA IGG SER QL ID: NEGATIVE
T VULGARIS AB SER QL ID: NEGATIVE

## 2024-09-23 DIAGNOSIS — I20.9 ANGINA PECTORIS, UNSPECIFIED (HCC): ICD-10-CM

## 2024-09-23 RX ORDER — LABETALOL 200 MG/1
200 TABLET, FILM COATED ORAL 2 TIMES DAILY
Qty: 180 TABLET | Refills: 3 | OUTPATIENT
Start: 2024-09-23

## 2024-09-23 RX ORDER — LABETALOL 200 MG/1
200 TABLET, FILM COATED ORAL 2 TIMES DAILY
Qty: 180 TABLET | Refills: 2 | Status: SHIPPED | OUTPATIENT
Start: 2024-09-23

## 2024-09-24 RX ORDER — BENAZEPRIL HYDROCHLORIDE 40 MG/1
40 TABLET ORAL DAILY
Qty: 90 TABLET | Refills: 3 | OUTPATIENT
Start: 2024-09-24

## 2024-09-25 ENCOUNTER — TELEPHONE (OUTPATIENT)
Dept: SLEEP MEDICINE | Age: 74
End: 2024-09-25

## 2024-09-30 ENCOUNTER — HOSPITAL ENCOUNTER (OUTPATIENT)
Dept: CT IMAGING | Age: 74
Discharge: HOME | End: 2024-10-03
Attending: INTERNAL MEDICINE
Payer: MEDICARE

## 2024-09-30 DIAGNOSIS — J84.9 ILD (INTERSTITIAL LUNG DISEASE) (HCC): ICD-10-CM

## 2024-09-30 PROCEDURE — 71250 CT THORAX DX C-: CPT

## 2024-10-22 ENCOUNTER — TELEPHONE (OUTPATIENT)
Dept: PULMONOLOGY | Age: 74
End: 2024-10-22

## 2024-10-22 PROBLEM — I27.20 PULMONARY HTN (HCC): Status: ACTIVE | Noted: 2024-09-17

## 2024-10-22 RX ORDER — SODIUM CHLORIDE 0.9 % (FLUSH) 0.9 %
5-40 SYRINGE (ML) INJECTION EVERY 12 HOURS SCHEDULED
Status: CANCELLED | OUTPATIENT
Start: 2024-10-22

## 2024-10-22 RX ORDER — SODIUM CHLORIDE 0.9 % (FLUSH) 0.9 %
5-40 SYRINGE (ML) INJECTION PRN
Status: CANCELLED | OUTPATIENT
Start: 2024-10-22

## 2024-10-22 RX ORDER — SODIUM CHLORIDE 9 MG/ML
INJECTION, SOLUTION INTRAVENOUS PRN
Status: CANCELLED | OUTPATIENT
Start: 2024-10-22

## 2024-10-22 NOTE — PROGRESS NOTES
Spoke with patient. Confirmed arrival time of 1130 for their 1300 procedure. Discussed NPO status after midnight. Reviewed  policy and advised patient to register in the lobby prior to coming to the GI lab. Patient verbalized understanding.

## 2024-10-22 NOTE — TELEPHONE ENCOUNTER
Patient was suppose to have Bronch done on 10/2 and this was cancelled.  Calling back to get this scheduled

## 2024-10-22 NOTE — TELEPHONE ENCOUNTER
Contacted granddaughter (on ALVIN) and scheduled bronch w/ BAL tomorrow, reviewed NPO & adult  instructions.

## 2024-10-23 ENCOUNTER — HOSPITAL ENCOUNTER (OUTPATIENT)
Age: 74
Discharge: HOME | End: 2024-10-23
Attending: INTERNAL MEDICINE | Admitting: INTERNAL MEDICINE
Payer: MEDICARE

## 2024-10-23 ENCOUNTER — TELEPHONE (OUTPATIENT)
Dept: PULMONOLOGY | Age: 74
End: 2024-10-23

## 2024-10-23 VITALS
RESPIRATION RATE: 18 BRPM | SYSTOLIC BLOOD PRESSURE: 142 MMHG | WEIGHT: 213 LBS | HEART RATE: 54 BPM | HEIGHT: 62 IN | TEMPERATURE: 98.6 F | DIASTOLIC BLOOD PRESSURE: 65 MMHG | OXYGEN SATURATION: 100 % | BODY MASS INDEX: 39.2 KG/M2

## 2024-10-23 DIAGNOSIS — J84.9 ILD (INTERSTITIAL LUNG DISEASE) (HCC): ICD-10-CM

## 2024-10-23 DIAGNOSIS — I27.20 PULMONARY HTN (HCC): ICD-10-CM

## 2024-10-23 DIAGNOSIS — J45.909 ASTHMA: ICD-10-CM

## 2024-10-23 PROCEDURE — 99152 MOD SED SAME PHYS/QHP 5/>YRS: CPT | Performed by: INTERNAL MEDICINE

## 2024-10-23 PROCEDURE — 7100000011 HC PHASE II RECOVERY - ADDTL 15 MIN: Performed by: INTERNAL MEDICINE

## 2024-10-23 PROCEDURE — 2709999900 HC NON-CHARGEABLE SUPPLY: Performed by: INTERNAL MEDICINE

## 2024-10-23 PROCEDURE — 3609010800 HC BRONCHOSCOPY ALVEOLAR LAVAGE: Performed by: INTERNAL MEDICINE

## 2024-10-23 PROCEDURE — 87102 FUNGUS ISOLATION CULTURE: CPT

## 2024-10-23 PROCEDURE — 87205 SMEAR GRAM STAIN: CPT

## 2024-10-23 PROCEDURE — 86356 MONONUCLEAR CELL ANTIGEN: CPT

## 2024-10-23 PROCEDURE — 87116 MYCOBACTERIA CULTURE: CPT

## 2024-10-23 PROCEDURE — 31624 DX BRONCHOSCOPE/LAVAGE: CPT | Performed by: INTERNAL MEDICINE

## 2024-10-23 PROCEDURE — 7100000010 HC PHASE II RECOVERY - FIRST 15 MIN: Performed by: INTERNAL MEDICINE

## 2024-10-23 PROCEDURE — 88112 CYTOPATH CELL ENHANCE TECH: CPT

## 2024-10-23 PROCEDURE — 6360000002 HC RX W HCPCS: Performed by: INTERNAL MEDICINE

## 2024-10-23 PROCEDURE — 87070 CULTURE OTHR SPECIMN AEROBIC: CPT

## 2024-10-23 PROCEDURE — 87206 SMEAR FLUORESCENT/ACID STAI: CPT

## 2024-10-23 PROCEDURE — 89051 BODY FLUID CELL COUNT: CPT

## 2024-10-23 RX ORDER — CHLOROPROCAINE HYDROCHLORIDE 30 MG/ML
INJECTION, SOLUTION EPIDURAL; INFILTRATION; INTRACAUDAL; PERINEURAL PRN
Status: DISCONTINUED | OUTPATIENT
Start: 2024-10-23 | End: 2024-10-23 | Stop reason: ALTCHOICE

## 2024-10-23 RX ORDER — MIDAZOLAM HYDROCHLORIDE 2 MG/2ML
INJECTION, SOLUTION INTRAMUSCULAR; INTRAVENOUS PRN
Status: DISCONTINUED | OUTPATIENT
Start: 2024-10-23 | End: 2024-10-23 | Stop reason: ALTCHOICE

## 2024-10-23 RX ORDER — CHLOROPROCAINE HYDROCHLORIDE 30 MG/ML
20 INJECTION, SOLUTION EPIDURAL; INFILTRATION; INTRACAUDAL; PERINEURAL ONCE
Status: DISCONTINUED | OUTPATIENT
Start: 2024-10-23 | End: 2024-10-23 | Stop reason: HOSPADM

## 2024-10-23 RX ORDER — CHLOROPROCAINE HYDROCHLORIDE 20 MG/ML
30 INJECTION, SOLUTION EPIDURAL; INFILTRATION; INTRACAUDAL; PERINEURAL ONCE
Status: DISCONTINUED | OUTPATIENT
Start: 2024-10-23 | End: 2024-10-23

## 2024-10-23 ASSESSMENT — PAIN - FUNCTIONAL ASSESSMENT
PAIN_FUNCTIONAL_ASSESSMENT: NONE - DENIES PAIN

## 2024-10-23 NOTE — PRE SEDATION
Sedation Pre-Procedure Note    Patient Name: Darcy Plascencia   YOB: 1950  Room/Bed: ENDO/PL  Medical Record Number: [id number]  Date: 10/23/2024   Time: 1:30 PM       Indication:  ILD    Consent: Consent was unable to be obtained due to patient's condition.    Vital Signs:   Vitals:    10/23/24 1326   BP: (!) 174/117   Pulse: 55       Past Medical History:   has a past medical history of Arthritis, Asthma, CAD (coronary artery disease), Chronic pain, CKD (chronic kidney disease), Crohn's disease (HCC), Depression, Diabetes mellitus type 2, controlled (HCC), GERD (gastroesophageal reflux disease), H/O heart artery stent, History of blood transfusion, History of MI (myocardial infarction), History of pneumonia, HTN (hypertension), Hypercholesteremia, Incarcerated umbilical hernia, Interstitial lung disease (HCC), Morbid obesity, Oxygen dependent, Pulmonary fibrosis (HCC), Pulmonary HTN (HCC), Sleep apnea, and Umbilical hernia without obstruction or gangrene.    Past Surgical History:   has a past surgical history that includes neurological surgery (12/04/2018); Cardiac catheterization (03/01/2016); heent; Colonoscopy; Endoscopy, colon, diagnostic; Upper gastrointestinal endoscopy (N/A, 6/2/2022); Colonoscopy (N/A, 6/2/2022); Cardiac procedure (N/A, 1/4/2024); and Umbilical hernia repair (N/A, 3/5/2024).    Medications:   Scheduled Meds:    chloroprocaine  20 mL Epidural Once     Continuous Infusions:   PRN Meds:   Home Meds:   Prior to Admission medications    Medication Sig Start Date End Date Taking? Authorizing Provider   labetalol (NORMODYNE) 200 MG tablet Take 1 tablet by mouth 2 times daily 9/23/24   Mejia Laird MD   FEROSUL 325 (65 Fe) MG tablet  8/10/24   ProviderCelso MD   valsartan (DIOVAN) 160 MG tablet Take 1 tablet by mouth 2 times daily 8/20/24   Mejia Laird MD   Riociguat 0.5 MG TABS Take 0.5 mg by mouth in the morning, at noon, and at bedtime for 14 days, THEN 1 mg in the

## 2024-10-23 NOTE — PROGRESS NOTES
PROCEDURE:  Bronchoscopy with airway inspection /cleanout /BAL /    INDICATION: ILD    EQUIPMENT:   Olympus bronchoscope    ANESTHESIA:   Fentanyl 100 mcg IV;   Versed 4mg IV;   Chloroprocaine 120mg to tracheo-bronchial tree and vocal cords;     IMAGING: reviewed      AIRWAY INSPECTION  After obtaining informed consent,  Olympus Bronchoscope         RIGHT  LOCATION NORM/ABNORM DESCRIPTION   Larynx NL    VOCAL CORDS NL    TRACHEA NL    TAINA NL    RMSB NL    RUL NL    BI NL    RML NL BAL performed in lateral segment x 2   RLL NL    SUP SEGM RLL NL    MED BASAL NL    ANTERIOR BASAL NL    LATERAL BASAL NL    POSTERIOR BASAL NL          LEFT  LOCATION NORM/ABNORM DESCRIPTION   LMSB NL    ANNE MARIE NL    LINGULA NL    LLL NL    SUPERIOR SEG LLL NL    AGA-MEDIAL LLL NL    LATERAL LLL NL    POSTERIOR LLL NL      The following samples were obtained:    BAL: RML with good return  Fluid has been sent for cell count with diff, bacterial cultures, AFB and fungal cultures and CD4:CD8 ratio    The procedure was completed without complication and the patient tolerated the procedure well.    EBL: none    Recommendations:   Follow up results.  Make sure that CD4:8 ratio is prepared and sent out immediately please  Ana Sousa MD

## 2024-10-23 NOTE — TELEPHONE ENCOUNTER
----- Message from Dr. Ana Sousa MD sent at 10/22/2024  3:51 PM EDT -----  Please let Mrs. Plascencia know that her CT shows some inflammation in her lungs and small airways that I think correllates with the wheezing.   that I think would benefit from proceeding with bronchoscopy for further testing try to establish a diagnosis.  Updated complete PFT would be useful as well.  Please help to facilitate scheduling of these studies.  I also think we should try to get her an earlier follow up appointment if at all possible.

## 2024-10-23 NOTE — H&P
Relation Age of Onset    Heart Disease Mother     Liver Disease Father         alcohol related    Diabetes Sister      Allergies   Allergen Reactions    Latex Rash    Lidocaine Anaphylaxis     Allergic to all \"KERRIE\"    Propofol Other (See Comments)     Paralysis  pt  and granddaughter state pt was admitted and placed on ventilator for 2 weeks due to severe asthma exacerbation in 2013. Pt experienced severe muscles weakness and was sent to rehab after discharge, which was assumed to be related to the administrations of Propofol.      Tadalafil (Pah) Swelling, Other (See Comments) and Headaches     Leg pain    Penicillins Other (See Comments)    Ceftriaxone Rash    Cephalosporins Rash    Morphine Rash    Pregabalin Rash     Objective:   There were no vitals filed for this visit.  PHYSICAL EXAM   Constitutional:  the patient is well developed and in no acute distress  EENMT:  Sclera clear, pupils equal, oral mucosa moist  Respiratory: symmetric chest rise.   Cardiovascular:  RRR without M,G,R.   Gastrointestinal: soft and non-tender; with positive bowel sounds.  Musculoskeletal: warm without cyanosis. Normal muscle tone.   Skin:  no jaundice or rashes, no wounds   Neurologic: symmetric strength, fluent speech  Psychiatric:  calm, appropriate, oriented x 4    Imaging: I performed an independent interpretation of the patient's images.  CXR:   No results found for this or any previous visit.    Assessment and Plan:  (Medical Decision Making)   Impression:   ILD - will perform bronchoscopy with CD4:8 ratio from BAL today, cell count with differential.     More than 50% of the time documented was spent in face-to-face contact with the patient and in the care of the patient on the floor/unit where the patient is located.    Ana Sousa MD    Dictated using voice recognition software.  Proof read but unrecognized errors may exist.

## 2024-10-24 LAB
DIFFERENTIAL: NORMAL
EOSINOPHIL NFR BRONCH MANUAL: 0 %
LYMPHOCYTES NFR BRONCH MANUAL: 30 %
MACROPHAGES NFR BRONCH MANUAL: 0 %
NEUTROPHILS NFR BRONCH MANUAL: 70 %

## 2024-10-25 LAB
ACID FAST STN SPEC: NEGATIVE
BACTERIA SPEC CULT: NORMAL
FUNGAL CULT/SMEAR: NORMAL
FUNGUS SMEAR: NORMAL
GRAM STN SPEC: NORMAL
GRAM STN SPEC: NORMAL
MYCOBACTERIUM SPEC QL CULT: NORMAL
SERVICE CMNT-IMP: NORMAL
SPECIMEN PREPARATION: NORMAL
SPECIMEN PROCESSING: NORMAL
SPECIMEN SOURCE: NORMAL

## 2024-10-26 LAB
CD3 CELLS # SPEC: 94 %
CD3+CD4+ CELLS # BLD: 76 %
CD4/CD8 RATIO: 5.07 RATIO
CD8: 15 %
SPECIMEN SOURCE: NORMAL

## 2024-10-28 DIAGNOSIS — J45.30 MILD PERSISTENT ASTHMA WITHOUT COMPLICATION: ICD-10-CM

## 2024-10-28 LAB
FUNGUS SMEAR: NORMAL
SPECIMEN SOURCE: NORMAL

## 2024-10-28 NOTE — TELEPHONE ENCOUNTER
Patient needs refill on Dulera sent to [facility] in [place].     Rx placed and routed to Dr. Sousa to sign.

## 2024-11-20 LAB
FUNGAL CULT/SMEAR: NORMAL
FUNGUS (MYCOLOGY) CULTURE: NEGATIVE
FUNGUS SMEAR: NORMAL
REFLEX TO ID: NORMAL
SPECIMEN PROCESSING: NORMAL
SPECIMEN SOURCE: NORMAL
SPECIMEN SOURCE: NORMAL

## 2024-12-04 RX ORDER — AMLODIPINE BESYLATE 2.5 MG/1
2.5 TABLET ORAL 2 TIMES DAILY
Qty: 60 TABLET | Refills: 5 | OUTPATIENT
Start: 2024-12-04

## 2024-12-04 NOTE — TELEPHONE ENCOUNTER
Requested Prescriptions     Pending Prescriptions Disp Refills    amLODIPine (NORVASC) 2.5 MG tablet [Pharmacy Med Name: AMLODIPINE 2.5 MG TAB[*]] 180 tablet 2     Sig: TAKE ONE TABLET BY MOUTH EVERY MORNING AND TAKE ONE TABLET BY MOUTH AT BEDTIME

## 2024-12-06 NOTE — PROGRESS NOTES
tablet by mouth daily    atorvastatin (LIPITOR) 80 MG tablet Take 1 tablet by mouth every evening (Patient not taking: Reported on 12/9/2024)     No current facility-administered medications for this visit.        Allergies   Allergen Reactions    Latex Rash    Lidocaine Anaphylaxis     Allergic to all \"KERRIE\"    Propofol Other (See Comments)     Paralysis  pt  and granddaughter state pt was admitted and placed on ventilator for 2 weeks due to severe asthma exacerbation in 2013. Pt experienced severe muscles weakness and was sent to rehab after discharge, which was assumed to be related to the administrations of Propofol.      Tadalafil (Pah) Swelling, Other (See Comments) and Headaches     Leg pain    Penicillins Other (See Comments)    Ceftriaxone Rash    Cephalosporins Rash    Morphine Rash    Pregabalin Rash           PHYSICAL EXAM:     Wt Readings from Last 3 Encounters:   12/09/24 97.5 kg (215 lb)   10/23/24 96.6 kg (213 lb)   09/17/24 96.6 kg (213 lb)     BP Readings from Last 3 Encounters:   12/09/24 (!) 146/68   10/23/24 (!) 142/65   09/17/24 110/66       BP (!) 146/68   Pulse 62   Ht 1.575 m (5' 2\")   Wt 97.5 kg (215 lb)   BMI 39.32 kg/m²     Physical Exam  Vitals reviewed.   HENT:      Head: Normocephalic and atraumatic.   Eyes:      Extraocular Movements: Extraocular movements intact.      Pupils: Pupils are equal, round, and reactive to light.   Cardiovascular:      Rate and Rhythm: Normal rate.      Heart sounds: Normal heart sounds.   Pulmonary:      Effort: Pulmonary effort is normal.      Breath sounds: Normal breath sounds.   Abdominal:      General: Abdomen is flat.      Palpations: Abdomen is soft. There is no mass.   Musculoskeletal:         General: Normal range of motion.      Cervical back: Normal range of motion.   Skin:     General: Skin is warm and dry.   Neurological:      General: No focal deficit present.      Mental Status: She is alert and oriented to person, place, and time.

## 2024-12-07 LAB
ACID FAST STN SPEC: NEGATIVE
MYCOBACTERIUM SPEC QL CULT: NEGATIVE
SPECIMEN PREPARATION: NORMAL
SPECIMEN SOURCE: NORMAL

## 2024-12-09 ENCOUNTER — OFFICE VISIT (OUTPATIENT)
Age: 74
End: 2024-12-09
Payer: MEDICARE

## 2024-12-09 VITALS
SYSTOLIC BLOOD PRESSURE: 146 MMHG | DIASTOLIC BLOOD PRESSURE: 68 MMHG | HEIGHT: 62 IN | HEART RATE: 62 BPM | WEIGHT: 215 LBS | BODY MASS INDEX: 39.56 KG/M2

## 2024-12-09 DIAGNOSIS — I25.10 ASCVD (ARTERIOSCLEROTIC CARDIOVASCULAR DISEASE): Primary | ICD-10-CM

## 2024-12-09 DIAGNOSIS — I10 PRIMARY HYPERTENSION: ICD-10-CM

## 2024-12-09 PROCEDURE — 1123F ACP DISCUSS/DSCN MKR DOCD: CPT | Performed by: INTERNAL MEDICINE

## 2024-12-09 PROCEDURE — 3017F COLORECTAL CA SCREEN DOC REV: CPT | Performed by: INTERNAL MEDICINE

## 2024-12-09 PROCEDURE — 3077F SYST BP >= 140 MM HG: CPT | Performed by: INTERNAL MEDICINE

## 2024-12-09 PROCEDURE — 99214 OFFICE O/P EST MOD 30 MIN: CPT | Performed by: INTERNAL MEDICINE

## 2024-12-09 PROCEDURE — G8417 CALC BMI ABV UP PARAM F/U: HCPCS | Performed by: INTERNAL MEDICINE

## 2024-12-09 PROCEDURE — 1159F MED LIST DOCD IN RCRD: CPT | Performed by: INTERNAL MEDICINE

## 2024-12-09 PROCEDURE — 1036F TOBACCO NON-USER: CPT | Performed by: INTERNAL MEDICINE

## 2024-12-09 PROCEDURE — 3078F DIAST BP <80 MM HG: CPT | Performed by: INTERNAL MEDICINE

## 2024-12-09 PROCEDURE — G8484 FLU IMMUNIZE NO ADMIN: HCPCS | Performed by: INTERNAL MEDICINE

## 2024-12-09 PROCEDURE — G8427 DOCREV CUR MEDS BY ELIG CLIN: HCPCS | Performed by: INTERNAL MEDICINE

## 2024-12-09 PROCEDURE — 1090F PRES/ABSN URINE INCON ASSESS: CPT | Performed by: INTERNAL MEDICINE

## 2024-12-09 PROCEDURE — G8399 PT W/DXA RESULTS DOCUMENT: HCPCS | Performed by: INTERNAL MEDICINE

## 2024-12-09 PROCEDURE — 1126F AMNT PAIN NOTED NONE PRSNT: CPT | Performed by: INTERNAL MEDICINE

## 2024-12-09 RX ORDER — AMLODIPINE BESYLATE 2.5 MG/1
2.5 TABLET ORAL 2 TIMES DAILY
Qty: 60 TABLET | Refills: 5 | Status: SHIPPED | OUTPATIENT
Start: 2024-12-09

## 2024-12-09 RX ORDER — TIRZEPATIDE 2.5 MG/.5ML
2.5 INJECTION, SOLUTION SUBCUTANEOUS WEEKLY
Qty: 0.5 ML | Refills: 3 | Status: SHIPPED | OUTPATIENT
Start: 2024-12-09

## 2024-12-09 RX ORDER — TIRZEPATIDE 10 MG/.5ML
0.5 INJECTION, SOLUTION SUBCUTANEOUS WEEKLY
Qty: 0.5 ML | Refills: 3 | Status: SHIPPED | OUTPATIENT
Start: 2024-12-09

## 2024-12-09 RX ORDER — TIRZEPATIDE 5 MG/.5ML
5 INJECTION, SOLUTION SUBCUTANEOUS
Qty: 0.5 ML | Refills: 3 | Status: SHIPPED | OUTPATIENT
Start: 2024-12-09

## 2024-12-09 RX ORDER — TIRZEPATIDE 7.5 MG/.5ML
7.5 INJECTION, SOLUTION SUBCUTANEOUS WEEKLY
Qty: 0.5 ML | Refills: 3 | Status: SHIPPED | OUTPATIENT
Start: 2024-12-09

## 2024-12-09 RX ORDER — VALSARTAN 160 MG/1
160 TABLET ORAL 2 TIMES DAILY
Qty: 180 TABLET | Refills: 1 | Status: SHIPPED | OUTPATIENT
Start: 2024-12-09

## 2024-12-09 RX ORDER — LABETALOL 200 MG/1
200 TABLET, FILM COATED ORAL 2 TIMES DAILY
Qty: 180 TABLET | Refills: 2 | Status: SHIPPED | OUTPATIENT
Start: 2024-12-09

## 2024-12-09 RX ORDER — TIRZEPATIDE 12.5 MG/.5ML
0.5 INJECTION, SOLUTION SUBCUTANEOUS WEEKLY
Qty: 0.5 ML | Refills: 3 | Status: SHIPPED | OUTPATIENT
Start: 2024-12-09

## 2024-12-09 RX ORDER — CLONIDINE HYDROCHLORIDE 0.1 MG/1
0.1 TABLET ORAL 2 TIMES DAILY
Qty: 180 TABLET | Refills: 3 | Status: SHIPPED | OUTPATIENT
Start: 2024-12-09

## 2024-12-09 RX ORDER — ROPINIROLE 0.5 MG/1
0.5 TABLET, FILM COATED ORAL 3 TIMES DAILY
COMMUNITY

## 2024-12-11 ENCOUNTER — TELEPHONE (OUTPATIENT)
Age: 74
End: 2024-12-11

## 2024-12-11 RX ORDER — AMLODIPINE BESYLATE 2.5 MG/1
TABLET ORAL
Qty: 180 TABLET | Refills: 2 | OUTPATIENT
Start: 2024-12-11

## 2024-12-11 NOTE — TELEPHONE ENCOUNTER
Spoke with pharmacist Ricky states all the rx they received were for a quantity 0.5 mL but needs to be 2 ml. She will make a note for all rx's to be changed to the quantity of 2 mg.

## 2024-12-18 RX ORDER — BENAZEPRIL HYDROCHLORIDE 40 MG/1
40 TABLET ORAL DAILY
Qty: 90 TABLET | Refills: 3 | Status: SHIPPED | OUTPATIENT
Start: 2024-12-18

## 2025-01-04 NOTE — PROGRESS NOTES
during day and 4lpm 02 bled into bipap qhs    OYSCO 500 + D 500-5 MG-MCG TABS     polyethylene glycol (GLYCOLAX) 17 g, Oral, DAILY PRN    rOPINIRole (REQUIP) 0.5 mg, Oral, 3 TIMES DAILY    Tirzepatide (MOUNJARO) 10 MG/0.5ML SOAJ 0.5 mLs, SubCUTAneous, WEEKLY    Tirzepatide (MOUNJARO) 12.5 MG/0.5ML SOAJ 0.5 mLs, SubCUTAneous, WEEKLY    valsartan (DIOVAN) 160 mg, Oral, 2 TIMES DAILY    vitamin B-12 (CYANOCOBALAMIN) 1,000 mcg, Oral, DAILY    vitamin C (ASCORBIC ACID) 500 mg, Oral, DAILY

## 2025-01-08 ENCOUNTER — NURSE ONLY (OUTPATIENT)
Dept: PULMONOLOGY | Age: 75
End: 2025-01-08
Payer: MEDICARE

## 2025-01-08 ENCOUNTER — OFFICE VISIT (OUTPATIENT)
Dept: PULMONOLOGY | Age: 75
End: 2025-01-08

## 2025-01-08 VITALS
WEIGHT: 215 LBS | BODY MASS INDEX: 39.56 KG/M2 | RESPIRATION RATE: 22 BRPM | DIASTOLIC BLOOD PRESSURE: 58 MMHG | SYSTOLIC BLOOD PRESSURE: 118 MMHG | HEART RATE: 65 BPM | OXYGEN SATURATION: 94 % | TEMPERATURE: 97.3 F | HEIGHT: 62 IN

## 2025-01-08 DIAGNOSIS — Z23 ENCOUNTER FOR VACCINATION: ICD-10-CM

## 2025-01-08 DIAGNOSIS — I27.20 PULMONARY HYPERTENSION (HCC): Primary | ICD-10-CM

## 2025-01-08 DIAGNOSIS — R06.02 SHORTNESS OF BREATH: ICD-10-CM

## 2025-01-08 DIAGNOSIS — J84.9 ILD (INTERSTITIAL LUNG DISEASE) (HCC): Primary | ICD-10-CM

## 2025-01-08 DIAGNOSIS — J98.6 HEMIDIAPHRAGM PARALYSIS: ICD-10-CM

## 2025-01-08 DIAGNOSIS — J96.11 CHRONIC RESPIRATORY FAILURE WITH HYPOXIA: ICD-10-CM

## 2025-01-08 DIAGNOSIS — I27.20 PULMONARY HYPERTENSION (HCC): ICD-10-CM

## 2025-01-08 LAB
FEF25-27, POC: 1.5 L/S
FENO: 8 PPB
FET, POC: NORMAL
FEV 1 , POC: 1.43 L
FEV1/FVC, POC: NORMAL
FVC, POC: NORMAL
LUNG AGE, POC: NORMAL
PEF, POC: 5.14 L/S

## 2025-01-08 PROCEDURE — 94726 PLETHYSMOGRAPHY LUNG VOLUMES: CPT | Performed by: INTERNAL MEDICINE

## 2025-01-08 PROCEDURE — 94729 DIFFUSING CAPACITY: CPT | Performed by: INTERNAL MEDICINE

## 2025-01-08 PROCEDURE — 94010 BREATHING CAPACITY TEST: CPT | Performed by: INTERNAL MEDICINE

## 2025-01-08 RX ORDER — CALCIUM CARBONATE/VITAMIN D3 500MG-5MCG
TABLET ORAL
COMMUNITY
Start: 2024-12-03

## 2025-01-08 ASSESSMENT — PULMONARY FUNCTION TESTS: FENO: 8

## 2025-01-08 NOTE — PATIENT INSTRUCTIONS
Today we reviewed your bronchoscopy results which were essentially normal.  Lung function testing is not dramatically changed.  We plan to try to further assess for possible asthma.  Blood testing will be done today.  We are also doing a FENO test. \  We are hopeful the weight loss assists with symptoms of shortness of breath.  Please call [phone] if you start to develop lower extremity edema or gradually worsening shortness of breath.

## 2025-01-09 ENCOUNTER — TELEPHONE (OUTPATIENT)
Age: 75
End: 2025-01-09

## 2025-01-09 NOTE — TELEPHONE ENCOUNTER
Patient went to refill Mounjaro and the pharmacy was asking if it was time for her to up her dosage.  Please advise

## 2025-01-10 NOTE — TELEPHONE ENCOUNTER
Spoke with Flaco, discuss Mounjaro. States pt just finished up 2.5 mg. Advised to go to the next dose of 5 mg for weeks then increase every 4 week until max dose. Dairy voiced understanding.

## 2025-01-22 ENCOUNTER — TELEPHONE (OUTPATIENT)
Dept: SLEEP MEDICINE | Age: 75
End: 2025-01-22

## 2025-01-22 NOTE — PROGRESS NOTES
[facility]  [address]  [place] [address]  [phone]    Patient Name:  Darcy Plascencia  YOB: 1950      Office Visit 1/23/2025    CHIEF COMPLAINT:    Chief Complaint   Patient presents with    Sleep Apnea    Follow-up         HISTORY OF PRESENT ILLNESS:  Patient is an 74 y.o. female seen today for follow up of NEFTALI. Pt had a PSG/HST on 4/1/03 with an AHI of 19.8/hr with desaturations to 78%. Pt is on BiPAP 14/9 cm H2O with 4L O2 bled in.   Pt is accompanied by her granddaughter. Pt reports that she is doing fine. She is on 2L O2 during the day and still has her O2 at 3L bled into her BiPAP. I spoke with her granddaughter and asked her to increase her O2 to 4L on her concentrator.   Pt sleeps well. She reports good energy during the day. She does nap during the day. She only naps if she is not busy.   She has excellent compliance with use 363/365 days with an average use of 8 hrs and 12 mins per day. Pt has a mask leak of 3L/min with an AHI of 0.9/hr. Pt is benefiting and tolerating PAP therapy           1/23/2025    11:08 AM 2/22/2024     2:13 PM 8/22/2023    11:00 AM 5/15/2023     2:14 PM 1/12/2023     2:29 PM   Sleep Medicine   Sitting and reading 2 3 3 3 2   Watching TV 2 3 3 2 2   Sitting, inactive in a public place (e.g. a theatre or a meeting) 0 0 3 1 0   As a passenger in a car for an hour without a break 0 0 3 1 0   Lying down to rest in the afternoon when circumstances permit 2 3 3 2 3   Sitting and talking to someone 0 0 0 1 0   Sitting quietly after a lunch without alcohol 2 3 3 1 0   In a car, while stopped for a few minutes in traffic 0 0 1 1 0   [place] Sleepiness Score 8 12 19 12 7         Past Medical History:   Diagnosis Date    Arthritis     tylenol arthritis    Asthma     daily inhler; used rescue inhaler 1 month ago (Nov 2018)- last used neb 1 week ago    CAD (coronary artery disease)     Followed by [facility].    Chronic pain     CKD

## 2025-01-23 ENCOUNTER — OFFICE VISIT (OUTPATIENT)
Dept: SLEEP MEDICINE | Age: 75
End: 2025-01-23
Payer: MEDICARE

## 2025-01-23 VITALS
SYSTOLIC BLOOD PRESSURE: 132 MMHG | DIASTOLIC BLOOD PRESSURE: 69 MMHG | HEIGHT: 62 IN | WEIGHT: 217.4 LBS | HEART RATE: 67 BPM | BODY MASS INDEX: 40.01 KG/M2 | RESPIRATION RATE: 16 BRPM | TEMPERATURE: 97.2 F | OXYGEN SATURATION: 92 %

## 2025-01-23 DIAGNOSIS — G47.33 OSA (OBSTRUCTIVE SLEEP APNEA): ICD-10-CM

## 2025-01-23 DIAGNOSIS — G25.81 RLS (RESTLESS LEGS SYNDROME): ICD-10-CM

## 2025-01-23 DIAGNOSIS — R79.9 ABNORMAL FINDING OF BLOOD CHEMISTRY, UNSPECIFIED: ICD-10-CM

## 2025-01-23 DIAGNOSIS — G47.34 NOCTURNAL HYPOXEMIA: ICD-10-CM

## 2025-01-23 DIAGNOSIS — G47.33 OSA (OBSTRUCTIVE SLEEP APNEA): Primary | ICD-10-CM

## 2025-01-23 DIAGNOSIS — R06.02 SHORTNESS OF BREATH: ICD-10-CM

## 2025-01-23 LAB
BASOPHILS # BLD: 0.05 K/UL (ref 0–0.2)
BASOPHILS NFR BLD: 0.7 % (ref 0–2)
DIFFERENTIAL METHOD BLD: ABNORMAL
EOSINOPHIL # BLD: 0.22 K/UL (ref 0–0.8)
EOSINOPHIL NFR BLD: 2.9 % (ref 0.5–7.8)
ERYTHROCYTE [DISTWIDTH] IN BLOOD BY AUTOMATED COUNT: 13.6 % (ref 11.9–14.6)
FERRITIN SERPL-MCNC: 64 NG/ML (ref 8–388)
HCT VFR BLD AUTO: 44.4 % (ref 35.8–46.3)
HGB BLD-MCNC: 12.9 G/DL (ref 11.7–15.4)
IMM GRANULOCYTES # BLD AUTO: 0.02 K/UL (ref 0–0.5)
IMM GRANULOCYTES NFR BLD AUTO: 0.3 % (ref 0–5)
LYMPHOCYTES # BLD: 1.24 K/UL (ref 0.5–4.6)
LYMPHOCYTES NFR BLD: 16.6 % (ref 13–44)
MCH RBC QN AUTO: 27.6 PG (ref 26.1–32.9)
MCHC RBC AUTO-ENTMCNC: 29.1 G/DL (ref 31.4–35)
MCV RBC AUTO: 94.9 FL (ref 82–102)
MONOCYTES # BLD: 0.62 K/UL (ref 0.1–1.3)
MONOCYTES NFR BLD: 8.3 % (ref 4–12)
NEUTS SEG # BLD: 5.34 K/UL (ref 1.7–8.2)
NEUTS SEG NFR BLD: 71.2 % (ref 43–78)
NRBC # BLD: 0 K/UL (ref 0–0.2)
NT PRO BNP: 348 PG/ML (ref 0–450)
PLATELET # BLD AUTO: 306 K/UL (ref 150–450)
PMV BLD AUTO: 9.6 FL (ref 9.4–12.3)
RBC # BLD AUTO: 4.68 M/UL (ref 4.05–5.2)
WBC # BLD AUTO: 7.5 K/UL (ref 4.3–11.1)

## 2025-01-23 PROCEDURE — 99214 OFFICE O/P EST MOD 30 MIN: CPT | Performed by: PHYSICIAN ASSISTANT

## 2025-01-23 PROCEDURE — 1123F ACP DISCUSS/DSCN MKR DOCD: CPT | Performed by: PHYSICIAN ASSISTANT

## 2025-01-23 PROCEDURE — 3078F DIAST BP <80 MM HG: CPT | Performed by: PHYSICIAN ASSISTANT

## 2025-01-23 PROCEDURE — G8427 DOCREV CUR MEDS BY ELIG CLIN: HCPCS | Performed by: PHYSICIAN ASSISTANT

## 2025-01-23 PROCEDURE — G8417 CALC BMI ABV UP PARAM F/U: HCPCS | Performed by: PHYSICIAN ASSISTANT

## 2025-01-23 PROCEDURE — 1159F MED LIST DOCD IN RCRD: CPT | Performed by: PHYSICIAN ASSISTANT

## 2025-01-23 PROCEDURE — 1126F AMNT PAIN NOTED NONE PRSNT: CPT | Performed by: PHYSICIAN ASSISTANT

## 2025-01-23 PROCEDURE — 3017F COLORECTAL CA SCREEN DOC REV: CPT | Performed by: PHYSICIAN ASSISTANT

## 2025-01-23 PROCEDURE — 3075F SYST BP GE 130 - 139MM HG: CPT | Performed by: PHYSICIAN ASSISTANT

## 2025-01-23 PROCEDURE — G8399 PT W/DXA RESULTS DOCUMENT: HCPCS | Performed by: PHYSICIAN ASSISTANT

## 2025-01-23 PROCEDURE — 1036F TOBACCO NON-USER: CPT | Performed by: PHYSICIAN ASSISTANT

## 2025-01-23 PROCEDURE — 1160F RVW MEDS BY RX/DR IN RCRD: CPT | Performed by: PHYSICIAN ASSISTANT

## 2025-01-23 PROCEDURE — 1090F PRES/ABSN URINE INCON ASSESS: CPT | Performed by: PHYSICIAN ASSISTANT

## 2025-01-23 ASSESSMENT — SLEEP AND FATIGUE QUESTIONNAIRES
HOW LIKELY ARE YOU TO NOD OFF OR FALL ASLEEP WHILE SITTING INACTIVE IN A PUBLIC PLACE: WOULD NEVER DOZE
HOW LIKELY ARE YOU TO NOD OFF OR FALL ASLEEP IN A CAR, WHILE STOPPED FOR A FEW MINUTES IN TRAFFIC: WOULD NEVER DOZE
ESS TOTAL SCORE: 8
HOW LIKELY ARE YOU TO NOD OFF OR FALL ASLEEP WHILE LYING DOWN TO REST IN THE AFTERNOON WHEN CIRCUMSTANCES PERMIT: MODERATE CHANCE OF DOZING
HOW LIKELY ARE YOU TO NOD OFF OR FALL ASLEEP WHILE WATCHING TV: MODERATE CHANCE OF DOZING
HOW LIKELY ARE YOU TO NOD OFF OR FALL ASLEEP WHILE SITTING QUIETLY AFTER LUNCH WITHOUT ALCOHOL: MODERATE CHANCE OF DOZING
HOW LIKELY ARE YOU TO NOD OFF OR FALL ASLEEP WHILE SITTING AND TALKING TO SOMEONE: WOULD NEVER DOZE
HOW LIKELY ARE YOU TO NOD OFF OR FALL ASLEEP WHEN YOU ARE A PASSENGER IN A CAR FOR AN HOUR WITHOUT A BREAK: WOULD NEVER DOZE
HOW LIKELY ARE YOU TO NOD OFF OR FALL ASLEEP WHILE SITTING AND READING: MODERATE CHANCE OF DOZING

## 2025-01-23 NOTE — PATIENT INSTRUCTIONS
The company who will be taking care of your CPAP supplies is:      Address: [address] [place] [address], [place] [address]  Phone: [phone]  Fax: [phone]

## 2025-01-24 ENCOUNTER — TELEPHONE (OUTPATIENT)
Dept: SLEEP MEDICINE | Age: 75
End: 2025-01-24

## 2025-01-24 RX ORDER — FERROUS SULFATE 325(65) MG
325 TABLET ORAL
Qty: 90 TABLET | Refills: 1 | Status: SHIPPED | OUTPATIENT
Start: 2025-01-24

## 2025-01-24 NOTE — TELEPHONE ENCOUNTER
Please call pt and let her know that her ferritin is still a little low. I would like her to take iron daily with vitamin c. I have sent a prescription to [facility] pharmacy.     Thank you.

## 2025-01-29 ENCOUNTER — TELEPHONE (OUTPATIENT)
Dept: SLEEP MEDICINE | Age: 75
End: 2025-01-29

## 2025-01-31 ENCOUNTER — TELEPHONE (OUTPATIENT)
Dept: PULMONOLOGY | Age: 75
End: 2025-01-31

## 2025-01-31 DIAGNOSIS — J45.30 MILD PERSISTENT ASTHMA WITHOUT COMPLICATION: ICD-10-CM

## 2025-01-31 NOTE — TELEPHONE ENCOUNTER
Spoke to patient and needs a new rx for dulera sent to pharmacy for a 90 day supply instead of a 30 day supply. She states its cheaper for 90 day supply.  Rx placed and routed to Dr. Sousa to sign.

## 2025-01-31 NOTE — TELEPHONE ENCOUNTER
Spoke to patient and let her know that her labs were reassuringly normal. No need to change any plans for now and she voices understanding.

## 2025-01-31 NOTE — TELEPHONE ENCOUNTER
----- Message from Dr. Ana Sousa MD sent at 1/29/2025  6:27 PM EST -----  Please let Mrs. Plascencia know that her labs were reassuringly normal.  No need to change any plans for now.

## 2025-06-17 ENCOUNTER — TELEPHONE (OUTPATIENT)
Age: 75
End: 2025-06-17

## 2025-06-17 ENCOUNTER — OFFICE VISIT (OUTPATIENT)
Age: 75
End: 2025-06-17
Payer: MEDICARE

## 2025-06-17 VITALS
WEIGHT: 200 LBS | HEART RATE: 76 BPM | HEIGHT: 62 IN | SYSTOLIC BLOOD PRESSURE: 118 MMHG | DIASTOLIC BLOOD PRESSURE: 52 MMHG | BODY MASS INDEX: 36.8 KG/M2

## 2025-06-17 DIAGNOSIS — I25.10 ASCVD (ARTERIOSCLEROTIC CARDIOVASCULAR DISEASE): Primary | ICD-10-CM

## 2025-06-17 PROCEDURE — G8399 PT W/DXA RESULTS DOCUMENT: HCPCS | Performed by: INTERNAL MEDICINE

## 2025-06-17 PROCEDURE — G8428 CUR MEDS NOT DOCUMENT: HCPCS | Performed by: INTERNAL MEDICINE

## 2025-06-17 PROCEDURE — 99215 OFFICE O/P EST HI 40 MIN: CPT | Performed by: INTERNAL MEDICINE

## 2025-06-17 PROCEDURE — 93000 ELECTROCARDIOGRAM COMPLETE: CPT | Performed by: INTERNAL MEDICINE

## 2025-06-17 PROCEDURE — 3078F DIAST BP <80 MM HG: CPT | Performed by: INTERNAL MEDICINE

## 2025-06-17 PROCEDURE — 3074F SYST BP LT 130 MM HG: CPT | Performed by: INTERNAL MEDICINE

## 2025-06-17 PROCEDURE — 1126F AMNT PAIN NOTED NONE PRSNT: CPT | Performed by: INTERNAL MEDICINE

## 2025-06-17 PROCEDURE — 1036F TOBACCO NON-USER: CPT | Performed by: INTERNAL MEDICINE

## 2025-06-17 PROCEDURE — G8417 CALC BMI ABV UP PARAM F/U: HCPCS | Performed by: INTERNAL MEDICINE

## 2025-06-17 PROCEDURE — 1090F PRES/ABSN URINE INCON ASSESS: CPT | Performed by: INTERNAL MEDICINE

## 2025-06-17 PROCEDURE — 1123F ACP DISCUSS/DSCN MKR DOCD: CPT | Performed by: INTERNAL MEDICINE

## 2025-06-17 RX ORDER — DENOSUMAB 60 MG/ML
60 INJECTION SUBCUTANEOUS ONCE
COMMUNITY
Start: 2025-04-16

## 2025-06-17 RX ORDER — HYDROCODONE BITARTRATE AND ACETAMINOPHEN 5; 325 MG/1; MG/1
TABLET ORAL
COMMUNITY

## 2025-06-17 RX ORDER — EZETIMIBE 10 MG/1
10 TABLET ORAL DAILY
COMMUNITY
Start: 2025-03-12 | End: 2026-03-12

## 2025-06-17 NOTE — PROGRESS NOTES
disease.    Diagnoses and all orders for this visit:    Primary hypertension  Improved  Continue current   Ascvd  Asymptomatic  Continue current rx.  Diabetic  On tirzepetide  Obesity  Chronic lung disease  Dyslipidemia  Statin intolerance  Start Leqvio  Other orders  -     valsartan (DIOVAN) 160 MG tablet; Take 1 tablet by mouth 2 times daily       Return in about 6 months (around 12/17/2025).       JOHNNY MORAES MD  6/17/2025  2:37 PM

## 2025-06-23 ENCOUNTER — TELEPHONE (OUTPATIENT)
Age: 75
End: 2025-06-23

## 2025-06-23 NOTE — TELEPHONE ENCOUNTER
Patients granddaughter called stating she needs the following :    Names of the patients Cholesterol medications  Going to nephrologist for an OV  Please call and advise.    [phone]

## 2025-06-27 PROBLEM — E78.5 DYSLIPIDEMIA: Status: ACTIVE | Noted: 2025-06-27

## 2025-07-16 ENCOUNTER — TELEPHONE (OUTPATIENT)
Age: 75
End: 2025-07-16

## 2025-07-16 NOTE — TELEPHONE ENCOUNTER
Granddaughter called stating her grammy has the following issues :    Presently takes ezetimibe (ZETIA) 10 MG tablet   Also, is taking Leqvio tomorrow  Question : Should patient be taking both of these?    Please call advise.

## 2025-08-04 ENCOUNTER — TELEPHONE (OUTPATIENT)
Dept: SLEEP MEDICINE | Age: 75
End: 2025-08-04

## 2025-08-26 RX ORDER — VALSARTAN 160 MG/1
160 TABLET ORAL 2 TIMES DAILY
Qty: 180 TABLET | Refills: 3 | Status: SHIPPED | OUTPATIENT
Start: 2025-08-26

## (undated) DEVICE — GUIDEWIRE VASC L260CM DIA0.025IN TIP L7CM DIA3MM STD PTFE J

## (undated) DEVICE — PAD,NON-ADHERENT,3X8,STERILE,LF,1/PK: Brand: MEDLINE

## (undated) DEVICE — CATHETER THRMDIL 7FR L110CM STD PULM ART 4 INFUS LUMN SWN

## (undated) DEVICE — SUTURE VCRL SZ 3-0 L18IN ABSRB VLT L26MM SH 1/2 CIR J774D

## (undated) DEVICE — FORCEPS BX 240CM 2.4MM L NDL RAD JAW 4 M00513334

## (undated) DEVICE — APPLICATOR MEDICATED 26 CC SOLUTION HI LT ORNG CHLORAPREP

## (undated) DEVICE — GAUZE,SPONGE,4"X4",12PLY,WOVEN,NS,LF: Brand: MEDLINE

## (undated) DEVICE — STRIP,CLOSURE,WOUND,MEDI-STRIP,1/2X4: Brand: MEDLINE

## (undated) DEVICE — TRAY EPI PERIFIX CONT 17GAX3.5IN TUOHY 19GA SPRINGWOUND OPN

## (undated) DEVICE — SUTURE PROL SZ 0 L30IN NONABSORBABLE BLU L26MM CT-2 1/2 CIR 8412H

## (undated) DEVICE — PLASTIC ADHESIVE BANDAGE: Brand: CURITY

## (undated) DEVICE — BREVI-XL™ 19G X 14": Brand: EPIMED

## (undated) DEVICE — 3M™ TEGADERM™ TRANSPARENT FILM DRESSING FRAME STYLE, 1626W, 4 IN X 4-3/4 IN (10 CM X 12 CM), 50/CT 4CT/CASE: Brand: 3M™ TEGADERM™

## (undated) DEVICE — MASTISOL ADHESIVE LIQ 2/3ML

## (undated) DEVICE — CONTAINER FORMALIN PFILLED 10% NBF 40ML

## (undated) DEVICE — Device

## (undated) DEVICE — FLEXIBLE INTRODUCER CANNULA W/ 17G X 3.5" TUOHY  METAL HUB: Brand: EPIMED

## (undated) DEVICE — MOUTHPIECE ENDOSCP L CTRL OPN AND SIDE PORTS DISP

## (undated) DEVICE — CANNULA NSL ORAL AD FOR CAPNOFLEX CO2 O2 AIRLFE

## (undated) DEVICE — 3M™ IOBAN™ 2 ANTIMICROBIAL INCISE DRAPE 6650EZ: Brand: IOBAN™ 2

## (undated) DEVICE — SUTURE VCRL SZ 3-0 L18IN ABSRB UD L26MM SH 1/2 CIR J864D

## (undated) DEVICE — GAUZE,SPONGE,2"X2",8PLY,STERILE,LF,2'S: Brand: MEDLINE

## (undated) DEVICE — GLOVE SURG SZ 65 THK91MIL LTX FREE SYN POLYISOPRENE

## (undated) DEVICE — GLOVE SURG SZ 7 L12IN FNGR THK79MIL GRN LTX FREE

## (undated) DEVICE — GLIDESHEATH SLENDER STAINLESS STEEL KIT: Brand: GLIDESHEATH SLENDER

## (undated) DEVICE — SYRINGE MED 3ML CLR PLAS STD N CTRL LUERLOCK TIP DISP

## (undated) DEVICE — NEEDLE HYPO 21GA L1.5IN INTRAMUSCULAR S STL LATCH BVL UP

## (undated) DEVICE — DRAPE SHT 3 QTR PROXIMA 53X77 --

## (undated) DEVICE — NEEDLE SYR 18GA L1.5IN RED PLAS HUB S STL BLNT FILL W/O

## (undated) DEVICE — KENDALL RADIOLUCENT FOAM MONITORING ELECTRODE RECTANGULAR SHAPE: Brand: KENDALL

## (undated) DEVICE — SINGLE USE SUCTION VALVE MAJ-209: Brand: SINGLE USE SUCTION VALVE (STERILE)

## (undated) DEVICE — GLOVE SURG SZ 65 CRM LTX FREE POLYISOPRENE POLYMER BEAD ANTI

## (undated) DEVICE — SINGLE USE BIOPSY VALVE MAJ-210: Brand: SINGLE USE BIOPSY VALVE (STERILE)

## (undated) DEVICE — KIT SURG CUST BRONCHSCP CUST SFD

## (undated) DEVICE — CONNECTOR TBNG OD5-7MM O2 END DISP

## (undated) DEVICE — MINOR SPLIT GENERAL: Brand: MEDLINE INDUSTRIES, INC.

## (undated) DEVICE — SYRINGE MED 5ML STD CLR PLAS LUERLOCK TIP N CTRL DISP